# Patient Record
Sex: FEMALE | Race: WHITE | NOT HISPANIC OR LATINO | Employment: FULL TIME | URBAN - METROPOLITAN AREA
[De-identification: names, ages, dates, MRNs, and addresses within clinical notes are randomized per-mention and may not be internally consistent; named-entity substitution may affect disease eponyms.]

---

## 2023-11-08 ENCOUNTER — OFFICE VISIT (OUTPATIENT)
Dept: PRIMARY CARE | Facility: CLINIC | Age: 38
End: 2023-11-08
Payer: COMMERCIAL

## 2023-11-08 ENCOUNTER — LAB (OUTPATIENT)
Dept: LAB | Facility: LAB | Age: 38
End: 2023-11-08
Payer: COMMERCIAL

## 2023-11-08 VITALS
HEART RATE: 90 BPM | WEIGHT: 152 LBS | BODY MASS INDEX: 26.93 KG/M2 | OXYGEN SATURATION: 100 % | HEIGHT: 63 IN | SYSTOLIC BLOOD PRESSURE: 98 MMHG | DIASTOLIC BLOOD PRESSURE: 62 MMHG

## 2023-11-08 DIAGNOSIS — E55.9 VITAMIN D DEFICIENCY: ICD-10-CM

## 2023-11-08 DIAGNOSIS — Z00.00 WELLNESS EXAMINATION: Primary | ICD-10-CM

## 2023-11-08 DIAGNOSIS — E55.9 VITAMIN D DEFICIENCY: Primary | ICD-10-CM

## 2023-11-08 DIAGNOSIS — K21.9 GASTROESOPHAGEAL REFLUX DISEASE, UNSPECIFIED WHETHER ESOPHAGITIS PRESENT: ICD-10-CM

## 2023-11-08 DIAGNOSIS — R53.83 OTHER FATIGUE: ICD-10-CM

## 2023-11-08 DIAGNOSIS — F41.8 DEPRESSION WITH ANXIETY: ICD-10-CM

## 2023-11-08 DIAGNOSIS — R20.2 PARESTHESIA: ICD-10-CM

## 2023-11-08 PROBLEM — D22.9 NUMEROUS MOLES: Status: ACTIVE | Noted: 2018-03-17

## 2023-11-08 PROBLEM — M43.16 SPONDYLOLISTHESIS, LUMBAR REGION: Status: ACTIVE | Noted: 2020-01-30

## 2023-11-08 PROBLEM — M51.369 DEGENERATIVE LUMBAR DISC: Status: ACTIVE | Noted: 2023-11-08

## 2023-11-08 PROBLEM — R20.0 RIGHT LEG NUMBNESS: Status: ACTIVE | Noted: 2023-11-08

## 2023-11-08 PROBLEM — M51.36 DEGENERATIVE LUMBAR DISC: Status: ACTIVE | Noted: 2023-11-08

## 2023-11-08 LAB
TSH SERPL-ACNC: 1.55 MIU/L (ref 0.44–3.98)
VIT B12 SERPL-MCNC: 348 PG/ML (ref 211–911)

## 2023-11-08 PROCEDURE — 36415 COLL VENOUS BLD VENIPUNCTURE: CPT

## 2023-11-08 PROCEDURE — 84443 ASSAY THYROID STIM HORMONE: CPT

## 2023-11-08 PROCEDURE — 82607 VITAMIN B-12: CPT

## 2023-11-08 PROCEDURE — 82306 VITAMIN D 25 HYDROXY: CPT

## 2023-11-08 PROCEDURE — 99214 OFFICE O/P EST MOD 30 MIN: CPT | Performed by: INTERNAL MEDICINE

## 2023-11-08 PROCEDURE — 1036F TOBACCO NON-USER: CPT | Performed by: INTERNAL MEDICINE

## 2023-11-08 RX ORDER — PANTOPRAZOLE SODIUM 40 MG/1
40 TABLET, DELAYED RELEASE ORAL
COMMUNITY
Start: 2023-06-27 | End: 2024-04-23

## 2023-11-08 RX ORDER — ERGOCALCIFEROL 1.25 MG/1
50000 CAPSULE ORAL
Qty: 12 CAPSULE | Refills: 1 | Status: SHIPPED | OUTPATIENT
Start: 2023-11-08 | End: 2023-11-12 | Stop reason: SDUPTHER

## 2023-11-08 RX ORDER — FLUOXETINE 20 MG/1
1 TABLET ORAL DAILY
COMMUNITY
End: 2023-11-08 | Stop reason: ALTCHOICE

## 2023-11-08 RX ORDER — ESCITALOPRAM OXALATE 10 MG/1
10 TABLET ORAL DAILY
Qty: 30 TABLET | Refills: 5 | Status: SHIPPED | OUTPATIENT
Start: 2023-11-08 | End: 2024-02-26

## 2023-11-08 ASSESSMENT — ENCOUNTER SYMPTOMS
NUMBNESS: 1
ARTHRALGIAS: 0
DYSPHORIC MOOD: 0
SLEEP DISTURBANCE: 0
PALPITATIONS: 0
DIZZINESS: 0
FATIGUE: 1
CONSTIPATION: 0
BACK PAIN: 1
DIARRHEA: 0
NERVOUS/ANXIOUS: 1
HEADACHES: 0
SHORTNESS OF BREATH: 0
COUGH: 0

## 2023-11-08 NOTE — PROGRESS NOTES
"Subjective   Patient ID: Isabelle Tam is a 38 y.o. female who presents for Weight Gain, Fatigue, Leg Problem (Right leg/ankle burning sensation), and Arm Problem (Arms are tingling).    Fatigue  Longstanding which patient rates as significant in degree.  Present at previous visit dating back to at least 6/2022.  In 4/2023, lab work completed including normal CBC and TSH.  In 9/2023, lab work including CBC, CMP normal.  Persistent vitamin D deficiency, not currently taking supplementation (previously on 50,000 IU weekly)  Patient is on longstanding treatment for depression/anxiety and states doing fairly well, though some increased anxiety.  No recent change in therapy.  No insomnia.  No observed erratic breathing or snoring per patient report from .  Occasional napping on the weekends.    Weight gain (approximately 20 pounds since 6/2022, stable since 4/2023)  Patient has been unable to lose weight.  Has been on low carbohydrate diet in the past, currently on [place] diet (with intermittent fasting) for the last 2 months without any weight loss.    Lumbar degenerative spine disorder with history of previous L5-S1 fusion  Longstanding posterior thigh and medial ankle \"burning\" pain.  Previously intermittent and now constant over the last few weeks  She follows with pain management, Dr. Cruz Velasco, at [facility].  MRI of the lumbar spine in October 2022 essentially normal with only mild L4-5 and L5-S1 foraminal stenosis.  No current back pain.  No leg weakness.  Appointment scheduled next week with Dr. Velasco    Generalized intermittent paresthesias  Patient describes localized \"tingling sensation\" which is migratory, involving all 4 extremities, most prominently left upper extremity.  No associated fasciculations.  No weakness, numbness, gait disturbance, neck pain.  No recent trauma or fall.  Onset 3 weeks ago.    Anxiety/depression  Longstanding treatment since age 16, has been on fluoxetine at same 20 " "mg dose for a number of years.  Briefly tried sertraline without benefit.  She and her  note increased anxiety in recent weeks/months.  Previously has seen psychologist, not currently, most recently 2 years ago prior to change in occupation (current job is going well)    Health maintenance  Vaccinations are up-to-date      Fatigue  Associated symptoms include fatigue and numbness. Pertinent negatives include no arthralgias, chest pain, coughing or headaches.        Review of Systems   Constitutional:  Positive for fatigue.   Respiratory:  Negative for cough and shortness of breath.    Cardiovascular:  Negative for chest pain, palpitations and leg swelling.   Gastrointestinal:  Negative for constipation and diarrhea.   Musculoskeletal:  Positive for back pain. Negative for arthralgias.   Neurological:  Positive for numbness. Negative for dizziness and headaches.   Psychiatric/Behavioral:  Negative for dysphoric mood and sleep disturbance. The patient is nervous/anxious.        Objective   BP 98/62 (BP Location: Left arm, Patient Position: Sitting, BP Cuff Size: Adult)   Pulse 90   Ht 1.6 m (5' 3\")   Wt 68.9 kg (152 lb)   SpO2 100%   BMI 26.93 kg/m²     Physical Exam  Vitals reviewed.   HENT:      Head: Normocephalic.      Right Ear: Tympanic membrane normal.      Left Ear: Tympanic membrane normal.      Mouth/Throat:      Mouth: Mucous membranes are moist.   Eyes:      Conjunctiva/sclera: Conjunctivae normal.      Pupils: Pupils are equal, round, and reactive to light.   Cardiovascular:      Rate and Rhythm: Normal rate and regular rhythm.   Pulmonary:      Effort: Pulmonary effort is normal.      Breath sounds: Normal breath sounds.   Abdominal:      General: Bowel sounds are normal.      Tenderness: There is no abdominal tenderness.   Musculoskeletal:      Right lower leg: No edema.      Left lower leg: No edema.   Neurological:      Mental Status: She is alert.      Comments: Facial movement " symmetric  Motor exam: 5/5 in all 4 extremities  DTRs: 2+ in all 4 extremities  Sensory to vibration and light touch: Intact  Negative Romberg, stable on single foot standing   Psychiatric:         Mood and Affect: Mood normal.         Assessment/Plan     Fatigue  TSH and B12 lab work ordered  Maintain adequate sleep at least 7 to 8 hours per night.  Observe for any erratic sleep pattern (per )    Weight gain (approximately 20 pounds since 6/2022, stable since 4/2023)  Referral for nutrition consult  Maintain low carbohydrate/low sugar diet.  Intermittent fasting can be continued.  Continue regular exercise routine with goal of 120-150 minutes/week (primarily aerobic for more calorie burning)    Lumbar degenerative spine disorder with history of previous L5-S1 fusion  Persistent right lower extremity pain, possible lumbar radiculopathy  Upcoming appointment with Dr. Velasco, pain management at [facility], scheduled for next week  Continue techniques to protect back (proper lifting, avoiding heavy lifting, etc.)    Generalized intermittent paresthesias  B12 and vitamin D lab work ordered  Treat low vitamin D  Monitor expectantly    Anxiety/depression  Stop fluoxetine   Start Lexapro/escitalopram 10 mg daily    Health maintenance  Vaccinations are up-to-date    Follow-up  Wellness exam/physical in February 2024  (Fasting lab work will be ordered to complete prior to visit)    Tyler North MD

## 2023-11-08 NOTE — PATIENT INSTRUCTIONS
Fatigue  TSH and B12 lab work ordered  Maintain adequate sleep at least 7 to 8 hours per night.  Observe for any erratic sleep pattern (per )    Weight gain (approximately 20 pounds since 6/2022, stable since 4/2023)  Referral for nutrition consult  Maintain low carbohydrate/low sugar diet.  Intermittent fasting can be continued.  Continue regular exercise routine with goal of 120-150 minutes/week (primarily aerobic for more calorie burning)    Lumbar degenerative spine disorder with history of previous L5-S1 fusion  Persistent right lower extremity pain, possible lumbar radiculopathy  Upcoming appointment with Dr. Velasco, pain management at [facility], scheduled for next week  Continue techniques to protect back (proper lifting, avoiding heavy lifting, etc.)    Generalized intermittent paresthesias  B12 and vitamin D lab work ordered  Treat low vitamin D  Monitor expectantly    Anxiety/depression  Stop fluoxetine   Start Lexapro/escitalopram 10 mg daily    Health maintenance  Vaccinations are up-to-date    Follow-up  Wellness exam/physical in February 2024  (Fasting lab work will be ordered to complete prior to visit)

## 2023-11-11 ENCOUNTER — TELEPHONE (OUTPATIENT)
Dept: PRIMARY CARE | Facility: CLINIC | Age: 38
End: 2023-11-11
Payer: COMMERCIAL

## 2023-11-11 DIAGNOSIS — E55.9 VITAMIN D DEFICIENCY: Primary | ICD-10-CM

## 2023-11-11 LAB — 25(OH)D3 SERPL-MCNC: 22 NG/ML (ref 30–100)

## 2023-11-12 ENCOUNTER — TELEPHONE (OUTPATIENT)
Dept: PRIMARY CARE | Facility: CLINIC | Age: 38
End: 2023-11-12
Payer: COMMERCIAL

## 2023-11-12 DIAGNOSIS — E55.9 VITAMIN D DEFICIENCY: ICD-10-CM

## 2023-11-12 RX ORDER — ERGOCALCIFEROL 1.25 MG/1
50000 CAPSULE ORAL
Qty: 12 CAPSULE | Refills: 3 | Status: SHIPPED | OUTPATIENT
Start: 2023-11-12

## 2023-11-13 NOTE — TELEPHONE ENCOUNTER
Vitamin D level is low.  Patient will be resumed on vitamin D 50,000 IU weekly prescription sent to [facility].    Tyler North MD

## 2023-12-13 DIAGNOSIS — N94.6 DYSMENORRHEA: Primary | ICD-10-CM

## 2023-12-14 RX ORDER — LEVONORGESTREL AND ETHINYL ESTRADIOL AND ETHINYL ESTRADIOL 150-30(84)
1 KIT ORAL DAILY
Qty: 91 TABLET | Refills: 2 | Status: SHIPPED | OUTPATIENT
Start: 2023-12-14 | End: 2024-02-09 | Stop reason: ALTCHOICE

## 2023-12-28 ENCOUNTER — OFFICE VISIT (OUTPATIENT)
Dept: UROLOGY | Facility: CLINIC | Age: 38
End: 2023-12-28
Payer: COMMERCIAL

## 2023-12-28 DIAGNOSIS — R39.9 UTI SYMPTOMS: Primary | ICD-10-CM

## 2023-12-28 DIAGNOSIS — N92.4 EXCESSIVE BLEEDING IN PREMENOPAUSAL PERIOD: ICD-10-CM

## 2023-12-28 DIAGNOSIS — N89.8 VAGINAL DISCHARGE: ICD-10-CM

## 2023-12-28 LAB
POC APPEARANCE, URINE: CLEAR
POC BILIRUBIN, URINE: NEGATIVE
POC BLOOD, URINE: ABNORMAL
POC COLOR, URINE: YELLOW
POC GLUCOSE, URINE: NEGATIVE MG/DL
POC KETONES, URINE: NEGATIVE MG/DL
POC LEUKOCYTES, URINE: ABNORMAL
POC NITRITE,URINE: NEGATIVE
POC PH, URINE: 6.5 PH
POC PROTEIN, URINE: NEGATIVE MG/DL
POC SPECIFIC GRAVITY, URINE: <=1.005
POC UROBILINOGEN, URINE: 0.2 EU/DL

## 2023-12-28 PROCEDURE — 87086 URINE CULTURE/COLONY COUNT: CPT

## 2023-12-28 PROCEDURE — 81003 URINALYSIS AUTO W/O SCOPE: CPT | Performed by: OBSTETRICS & GYNECOLOGY

## 2023-12-28 PROCEDURE — 87205 SMEAR GRAM STAIN: CPT

## 2023-12-28 PROCEDURE — 1036F TOBACCO NON-USER: CPT | Performed by: OBSTETRICS & GYNECOLOGY

## 2023-12-28 PROCEDURE — 99214 OFFICE O/P EST MOD 30 MIN: CPT | Performed by: OBSTETRICS & GYNECOLOGY

## 2023-12-28 RX ORDER — NITROFURANTOIN 25; 75 MG/1; MG/1
100 CAPSULE ORAL 2 TIMES DAILY
Qty: 14 CAPSULE | Refills: 0 | Status: SHIPPED | OUTPATIENT
Start: 2023-12-28 | End: 2024-01-04

## 2023-12-28 RX ORDER — NORETHINDRONE ACETATE AND ETHINYL ESTRADIOL .02; 1 MG/1; MG/1
1 TABLET ORAL DAILY
Qty: 63 TABLET | Refills: 4 | Status: SHIPPED | OUTPATIENT
Start: 2023-12-28 | End: 2024-03-11

## 2023-12-28 NOTE — PROGRESS NOTES
FOLLOW-UP VISIT     PROBLEM LIST:  1. Perimenopausal symptoms       HISTORY OF PRESENT ILLNESS:   Isabelle Tam is a 38 y.o. female who was last seen 09/07/23 for bothersome perimenopausal symptoms. We discussed weight training and the [place] diet, as well as a sleep study. She is presenting today for her 3 month follow up.       Patient has complaints of dysuria and vaginal itchiness, she denies any gross hematuria.     She reports heavy menstrual bleeding.  Has been off of seasonique X 6 months and has had heavy bleeding in December and November. (One episode lasted 2.5 weeks.)        PAST MEDICAL HISTORY:  Past Medical History:   Diagnosis Date    Infectious mononucleosis, unspecified without complication     Mononucleosis    Melanocytic nevi, unspecified     Skin moles       PAST SURGICAL HISTORY:  Past Surgical History:   Procedure Laterality Date    ESOPHAGOGASTRODUODENOSCOPY  11/18/2014    Diagnostic Esophagogastroduodenoscopy    SPINAL FUSION  03/26/2014    Spinal Arthrodesis    TONSILLECTOMY  03/26/2014    Tonsillectomy With Adenoidectomy        ALLERGIES:   Allergies   Allergen Reactions    Morphine Unknown        MEDICATIONS:   [unfilled]     SOCIAL HISTORY:  Patient  reports that she has never smoked. She has never used smokeless tobacco. She reports current alcohol use of about 3.0 standard drinks of alcohol per week. She reports that she does not use drugs.   Social History     Socioeconomic History    Marital status:      Spouse name: Not on file    Number of children: Not on file    Years of education: Not on file    Highest education level: Not on file   Occupational History    Not on file   Tobacco Use    Smoking status: Never    Smokeless tobacco: Never   Substance and Sexual Activity    Alcohol use: Yes     Alcohol/week: 3.0 standard drinks of alcohol     Types: 3 Glasses of wine per week     Comment: Wine    Drug use: Never    Sexual activity: Not on file   Other Topics Concern    Not  on file   Social History Narrative    Not on file     Social Determinants of Health     Financial Resource Strain: Not on file   Food Insecurity: Not on file   Transportation Needs: Not on file   Physical Activity: Not on file   Stress: Not on file   Social Connections: Not on file   Intimate Partner Violence: Not on file   Housing Stability: Not on file       FAMILY HISTORY:  Family History   Problem Relation Name Age of Onset    Other (PMR) Mother      Other (pre-DM) Mother      Thyroid disease Father      Colon cancer Maternal Grandmother  93    Heart attack Maternal Grandfather          in 50s    Prostate cancer Paternal Grandfather         REVIEW OF SYSTEMS:  Constitutional: Negative for fever and chills. Denies anorexia, weight loss.  Eyes: Negative for visual disturbance.   Respiratory: Negative for shortness of breath.    Cardiovascular: Negative for chest pain.   Gastrointestinal: Negative for nausea and vomiting.   Genitourinary: See interval history above.  Skin: Negative for rash.   Neurological: Negative for dizziness and numbness.   Psychiatric/Behavioral: Negative for confusion and decreased concentration.     PHYSICAL EXAM:  There were no vitals taken for this visit.  Constitutional: Patient appears well-developed and well-nourished. No distress.    Head: Normocephalic and atraumatic.    Neck: Normal range of motion.    Cardiovascular: Normal rate.    Pulmonary/Chest: Effort normal. No respiratory distress.   Abdominal: normal  : normal  Musculoskeletal: Normal range of motion.    Neurological: Alert and oriented to person, place, and time.  Psychiatric: Normal mood and affect. Behavior is normal. Thought content normal.      PATHOLOGY REVIEW:    RADIOLOGY REVIEW:  [unfilled]    LABORATORY REVIEW:   [unfilled]       Urine dipstick shows positive for WBC's and positive for RBC's.       Assessment:      Isabelle Tam is a 38 y.o. female with AUB symptoms presenting for follow up     Plan:      Urine cultured ordered   US ordered   Will start Macrobid empirically  Resume OCP (Junel)  Sent swab for BV    Scribe Attestation  By signing my name below, I, Henry Gallagher   attest that this documentation has been prepared under the direction and in the presence of Miya Mcduffie MD MPH.

## 2023-12-29 LAB
BACTERIA UR CULT: NORMAL
BACTERIAL VAGINOSIS VAG-IMP: NORMAL
CLUE CELLS VAG LPF-#/AREA: NORMAL /[LPF]
NUGENT SCORE: 4
YEAST VAG WET PREP-#/AREA: NORMAL

## 2024-01-05 ENCOUNTER — ANCILLARY PROCEDURE (OUTPATIENT)
Dept: RADIOLOGY | Facility: CLINIC | Age: 39
End: 2024-01-05
Payer: COMMERCIAL

## 2024-01-05 DIAGNOSIS — N92.4 EXCESSIVE BLEEDING IN PREMENOPAUSAL PERIOD: ICD-10-CM

## 2024-01-05 PROCEDURE — 76830 TRANSVAGINAL US NON-OB: CPT

## 2024-01-05 PROCEDURE — 76856 US EXAM PELVIC COMPLETE: CPT | Performed by: STUDENT IN AN ORGANIZED HEALTH CARE EDUCATION/TRAINING PROGRAM

## 2024-01-05 PROCEDURE — 76830 TRANSVAGINAL US NON-OB: CPT | Performed by: STUDENT IN AN ORGANIZED HEALTH CARE EDUCATION/TRAINING PROGRAM

## 2024-02-06 ENCOUNTER — LAB (OUTPATIENT)
Dept: LAB | Facility: LAB | Age: 39
End: 2024-02-06
Payer: COMMERCIAL

## 2024-02-06 DIAGNOSIS — Z00.00 WELLNESS EXAMINATION: ICD-10-CM

## 2024-02-06 LAB
25(OH)D3 SERPL-MCNC: 57 NG/ML (ref 30–100)
ALBUMIN SERPL BCP-MCNC: 4.4 G/DL (ref 3.4–5)
ALP SERPL-CCNC: 39 U/L (ref 33–110)
ALT SERPL W P-5'-P-CCNC: 12 U/L (ref 7–45)
ANION GAP SERPL CALC-SCNC: 14 MMOL/L (ref 10–20)
APPEARANCE UR: CLEAR
AST SERPL W P-5'-P-CCNC: 14 U/L (ref 9–39)
BASOPHILS # BLD AUTO: 0.05 X10*3/UL (ref 0–0.1)
BASOPHILS NFR BLD AUTO: 0.7 %
BILIRUB SERPL-MCNC: 0.8 MG/DL (ref 0–1.2)
BILIRUB UR STRIP.AUTO-MCNC: NEGATIVE MG/DL
BUN SERPL-MCNC: 15 MG/DL (ref 6–23)
CALCIUM SERPL-MCNC: 9.6 MG/DL (ref 8.6–10.6)
CHLORIDE SERPL-SCNC: 103 MMOL/L (ref 98–107)
CHOLEST SERPL-MCNC: 226 MG/DL (ref 0–199)
CHOLESTEROL/HDL RATIO: 3.7
CO2 SERPL-SCNC: 26 MMOL/L (ref 21–32)
COLOR UR: NORMAL
CREAT SERPL-MCNC: 0.8 MG/DL (ref 0.5–1.05)
CRP SERPL HS-MCNC: 2.7 MG/L
EGFRCR SERPLBLD CKD-EPI 2021: >90 ML/MIN/1.73M*2
EOSINOPHIL # BLD AUTO: 0.1 X10*3/UL (ref 0–0.7)
EOSINOPHIL NFR BLD AUTO: 1.4 %
ERYTHROCYTE [DISTWIDTH] IN BLOOD BY AUTOMATED COUNT: 12.9 % (ref 11.5–14.5)
EST. AVERAGE GLUCOSE BLD GHB EST-MCNC: 100 MG/DL
GLUCOSE SERPL-MCNC: 96 MG/DL (ref 74–99)
GLUCOSE UR STRIP.AUTO-MCNC: NORMAL MG/DL
HBA1C MFR BLD: 5.1 %
HCT VFR BLD AUTO: 44 % (ref 36–46)
HDLC SERPL-MCNC: 61.3 MG/DL
HGB BLD-MCNC: 13.8 G/DL (ref 12–16)
IMM GRANULOCYTES # BLD AUTO: 0.02 X10*3/UL (ref 0–0.7)
IMM GRANULOCYTES NFR BLD AUTO: 0.3 % (ref 0–0.9)
KETONES UR STRIP.AUTO-MCNC: NEGATIVE MG/DL
LDLC SERPL CALC-MCNC: 138 MG/DL
LEUKOCYTE ESTERASE UR QL STRIP.AUTO: NEGATIVE
LYMPHOCYTES # BLD AUTO: 3.13 X10*3/UL (ref 1.2–4.8)
LYMPHOCYTES NFR BLD AUTO: 43.7 %
MCH RBC QN AUTO: 28.3 PG (ref 26–34)
MCHC RBC AUTO-ENTMCNC: 31.4 G/DL (ref 32–36)
MCV RBC AUTO: 90 FL (ref 80–100)
MONOCYTES # BLD AUTO: 0.48 X10*3/UL (ref 0.1–1)
MONOCYTES NFR BLD AUTO: 6.7 %
NEUTROPHILS # BLD AUTO: 3.38 X10*3/UL (ref 1.2–7.7)
NEUTROPHILS NFR BLD AUTO: 47.2 %
NITRITE UR QL STRIP.AUTO: NEGATIVE
NON HDL CHOLESTEROL: 165 MG/DL (ref 0–149)
NRBC BLD-RTO: 0 /100 WBCS (ref 0–0)
PH UR STRIP.AUTO: 5.5 [PH]
PLATELET # BLD AUTO: 273 X10*3/UL (ref 150–450)
POTASSIUM SERPL-SCNC: 4.4 MMOL/L (ref 3.5–5.3)
PROT SERPL-MCNC: 6.7 G/DL (ref 6.4–8.2)
PROT UR STRIP.AUTO-MCNC: NEGATIVE MG/DL
RBC # BLD AUTO: 4.87 X10*6/UL (ref 4–5.2)
RBC # UR STRIP.AUTO: NEGATIVE /UL
SODIUM SERPL-SCNC: 139 MMOL/L (ref 136–145)
SP GR UR STRIP.AUTO: 1.02
TRIGL SERPL-MCNC: 134 MG/DL (ref 0–149)
TSH SERPL-ACNC: 1.34 MIU/L (ref 0.44–3.98)
UROBILINOGEN UR STRIP.AUTO-MCNC: NORMAL MG/DL
VLDL: 27 MG/DL (ref 0–40)
WBC # BLD AUTO: 7.2 X10*3/UL (ref 4.4–11.3)

## 2024-02-06 PROCEDURE — 80061 LIPID PANEL: CPT

## 2024-02-06 PROCEDURE — 82306 VITAMIN D 25 HYDROXY: CPT

## 2024-02-06 PROCEDURE — 84443 ASSAY THYROID STIM HORMONE: CPT

## 2024-02-06 PROCEDURE — 80053 COMPREHEN METABOLIC PANEL: CPT

## 2024-02-06 PROCEDURE — 83036 HEMOGLOBIN GLYCOSYLATED A1C: CPT

## 2024-02-06 PROCEDURE — 86141 C-REACTIVE PROTEIN HS: CPT

## 2024-02-06 PROCEDURE — 36415 COLL VENOUS BLD VENIPUNCTURE: CPT

## 2024-02-06 PROCEDURE — 85025 COMPLETE CBC W/AUTO DIFF WBC: CPT

## 2024-02-06 PROCEDURE — 81003 URINALYSIS AUTO W/O SCOPE: CPT

## 2024-02-09 ENCOUNTER — OFFICE VISIT (OUTPATIENT)
Dept: PRIMARY CARE | Facility: CLINIC | Age: 39
End: 2024-02-09
Payer: COMMERCIAL

## 2024-02-09 VITALS
DIASTOLIC BLOOD PRESSURE: 62 MMHG | SYSTOLIC BLOOD PRESSURE: 98 MMHG | HEART RATE: 79 BPM | BODY MASS INDEX: 27.14 KG/M2 | WEIGHT: 159 LBS | HEIGHT: 64 IN | OXYGEN SATURATION: 98 %

## 2024-02-09 DIAGNOSIS — F41.8 DEPRESSION WITH ANXIETY: ICD-10-CM

## 2024-02-09 DIAGNOSIS — M51.36 DEGENERATIVE LUMBAR DISC: ICD-10-CM

## 2024-02-09 DIAGNOSIS — Z12.31 ENCOUNTER FOR SCREENING MAMMOGRAM FOR MALIGNANT NEOPLASM OF BREAST: ICD-10-CM

## 2024-02-09 DIAGNOSIS — Z00.00 WELLNESS EXAMINATION: Primary | ICD-10-CM

## 2024-02-09 DIAGNOSIS — K21.9 GASTROESOPHAGEAL REFLUX DISEASE, UNSPECIFIED WHETHER ESOPHAGITIS PRESENT: ICD-10-CM

## 2024-02-09 DIAGNOSIS — D22.9 NUMEROUS MOLES: ICD-10-CM

## 2024-02-09 DIAGNOSIS — E55.9 VITAMIN D DEFICIENCY: ICD-10-CM

## 2024-02-09 DIAGNOSIS — E78.2 MIXED HYPERLIPIDEMIA: ICD-10-CM

## 2024-02-09 PROBLEM — R20.0 RIGHT LEG NUMBNESS: Status: RESOLVED | Noted: 2023-11-08 | Resolved: 2024-02-09

## 2024-02-09 PROCEDURE — 1036F TOBACCO NON-USER: CPT | Performed by: INTERNAL MEDICINE

## 2024-02-09 PROCEDURE — UHSPHYS PR UH SELECT PHYSICAL: Performed by: INTERNAL MEDICINE

## 2024-02-09 RX ORDER — GABAPENTIN 100 MG/1
CAPSULE ORAL
COMMUNITY
Start: 2024-01-16

## 2024-02-09 ASSESSMENT — ENCOUNTER SYMPTOMS
RHINORRHEA: 0
WHEEZING: 0
COUGH: 0
PALPITATIONS: 0
SHORTNESS OF BREATH: 0
NUMBNESS: 0
DIZZINESS: 1
CONSTIPATION: 0
DYSURIA: 0
DYSPHORIC MOOD: 0
WEAKNESS: 0
SLEEP DISTURBANCE: 0
BACK PAIN: 1
FATIGUE: 1
MYALGIAS: 0
DIARRHEA: 0

## 2024-02-09 ASSESSMENT — PROMIS GLOBAL HEALTH SCALE
RATE_MENTAL_HEALTH: VERY GOOD
CARRYOUT_SOCIAL_ACTIVITIES: GOOD
RATE_AVERAGE_FATIGUE: SEVERE
RATE_QUALITY_OF_LIFE: GOOD
RATE_PHYSICAL_HEALTH: GOOD
EMOTIONAL_PROBLEMS: NEVER
RATE_AVERAGE_PAIN: 4
RATE_SOCIAL_SATISFACTION: EXCELLENT
CARRYOUT_PHYSICAL_ACTIVITIES: MOSTLY
RATE_GENERAL_HEALTH: GOOD

## 2024-02-09 NOTE — PATIENT INSTRUCTIONS
Wellness exam/physical  Continued regular exercise with goal of 120-150 minutes/week recommended  Continued well-balanced diet rich in fruits, vegetables, fiber, lean protein recommended  Continued routine follow-up with ophthalmology and dentistry recommended  Continued routine follow-up with GYN recommended, up-to-date  Screening mammogram with tomosynthesis ordered    Weight gain  Upcoming nutrition consult scheduled on 2/26  Continue proper diet and routine exercise    GERD/heartburn  Continue pantoprazole 40 mg daily  Consider lower dose medication or tapering off medication after achieving weight loss    Depression/anxiety  Continue escitalopram 10 mg daily  Continue regular exercise  Continue engaging in activities and hobbies that provide enjoyment and relaxation  Maintain adequate sleep schedule    Vitamin D deficiency  Continue vitamin D 50,000 IU weekly  Repeat vitamin D level in May    Intermittent lightheadedness (low normal blood pressure at baseline, negative orthostatics today)  Maintain adequate fluid intake, particularly after exercise  Monitor expectantly    Degenerative lumbar disc disorder/SI joint disorder  Monitor expectantly  Continue back stretching/strengthening exercises  Follow-up with pain management, Dr. Cruz Velasco ([facility]) as needed    Numerous moles  Continue annual full-body skin exams with dermatology, Dr. Horton    Follow-up  Wellness exam/physical in 1 year, on/after 2/9/2025

## 2024-02-09 NOTE — PROGRESS NOTES
"Subjective   Patient ID: Isabelle Tam is a 38 y.o. female who presents for Annual Exam.    Wellness exam/physical    Weight gain  Ongoing challenges regarding weight loss.  Regular exercise, proper diet  Upcoming nutrition consult scheduled on 2/26 (private practice)    GERD/heartburn  Onset of symptoms approximately 10 years ago.  Has been on PPI therapy intermittently.  Currently on PPI therapy since GI consult with Dr. Azevedo last year.  Currently, symptoms are well-controlled.  Historically, patient had nocturnal symptoms with heartburn and occasional regurgitation    Depression/anxiety  At last visit, fluoxetine switched to escitalopram.  Anxiety may be slightly better controlled.    Vitamin D deficiency  Vitamin D level has improved, now 57 on recently started vitamin D 50,000 IU weekly    Intermittent lightheadedness  Patient describes episodes of \"dizziness\".  Associated nausea/emesis.  No precipitating events.  Can occur either in seated or standing position.  No diaphoresis, palpitations, chest pain, actual syncope.  Patient will occasionally lie down and symptoms resolved within 10 to 20 minutes.  Onset perhaps 1 to 2 years ago.    Degenerative lumbar disc disorder/SI joint disorder  Currently stable.  Recent trigger point injections to thoracic paraspinal muscles and SI joints per Dr. Cruz Velasco ([facility] pain management) which have been helpful.  Previous paresthesias have improved.    Numerous moles  Annual full-body skin exams with dermatology, Dr. Hotron.  Up-to-date.    Health maintenance  Exercise: Walking 1 hour and weightlifting, 4 times a week  GYN: Up-to-date with Dr. Mcduffie, last exam 12/28/2023  Breast cancer screening: No prior mammography, ordered  Ophthalmology: Up-to-date  Dermatology: Up-to-date, see above  Dentistry: Up-to-date    Social    Occupation:  with [employer]           Review of Systems   Constitutional:  Positive for fatigue.   HENT:  " "Negative for postnasal drip and rhinorrhea.    Respiratory:  Negative for cough, shortness of breath and wheezing.    Cardiovascular:  Negative for chest pain and palpitations.   Gastrointestinal:  Negative for constipation and diarrhea.   Genitourinary:  Negative for dysuria and urgency.   Musculoskeletal:  Positive for back pain. Negative for myalgias.   Neurological:  Positive for dizziness. Negative for weakness and numbness.   Psychiatric/Behavioral:  Negative for dysphoric mood and sleep disturbance.        Objective   BP 98/62 (BP Location: Left arm, Patient Position: Sitting, BP Cuff Size: Adult)   Pulse 79   Ht 1.626 m (5' 4\")   Wt 72.1 kg (159 lb)   SpO2 98%   BMI 27.29 kg/m²     Physical Exam  Vitals reviewed.   Constitutional:       Appearance: Normal appearance.   HENT:      Head: Normocephalic.      Right Ear: Tympanic membrane normal.      Left Ear: Tympanic membrane normal.      Mouth/Throat:      Mouth: Mucous membranes are moist.   Eyes:      Conjunctiva/sclera: Conjunctivae normal.   Neck:      Vascular: No carotid bruit.   Cardiovascular:      Rate and Rhythm: Normal rate and regular rhythm.   Pulmonary:      Effort: Pulmonary effort is normal.      Breath sounds: Normal breath sounds.   Abdominal:      General: Bowel sounds are normal.      Tenderness: There is no abdominal tenderness.   Genitourinary:     Comments: Breast exam: Normal nipples, no masses or axillary lymphadenopathy bilaterally.  (Chaperone offered, patient declines)  Musculoskeletal:      Right lower leg: No edema.      Left lower leg: No edema.      Comments: Bilateral hips knees with full range of motion   Lymphadenopathy:      Cervical: No cervical adenopathy.   Skin:     General: Skin is warm.   Neurological:      General: No focal deficit present.      Mental Status: She is alert and oriented to person, place, and time.   Psychiatric:         Mood and Affect: Mood normal.         Assessment/Plan     Wellness " exam/physical  Continued regular exercise with goal of 120-150 minutes/week recommended  Continued well-balanced diet rich in fruits, vegetables, fiber, lean protein recommended  Continued routine follow-up with ophthalmology and dentistry recommended  Continued routine follow-up with GYN recommended, up-to-date  Screening mammogram with tomosynthesis ordered    Weight gain  Upcoming nutrition consult scheduled on 2/26  Continue proper diet and routine exercise    GERD/heartburn  Continue pantoprazole 40 mg daily  Consider lower dose medication or tapering off medication after achieving weight loss    Depression/anxiety  Continue escitalopram 10 mg daily  Continue regular exercise  Continue engaging in activities and hobbies that provide enjoyment and relaxation  Maintain adequate sleep schedule    Vitamin D deficiency  Continue vitamin D 50,000 IU weekly  Repeat vitamin D level in May    Intermittent lightheadedness (low normal blood pressure at baseline, negative orthostatics today)  Maintain adequate fluid intake, particularly after exercise  Monitor expectantly    Degenerative lumbar disc disorder/SI joint disorder  Monitor expectantly  Continue back stretching/strengthening exercises  Referral to [facility] for massage therapy session  Follow-up with pain management, Dr. Cruz Velasco ([facility]) as needed    Numerous moles  Continue annual full-body skin exams with dermatology, Dr. Horton    Follow-up  Wellness exam/physical in 1 year, on/after 2/9/2025    Tyler North MD

## 2024-02-20 ENCOUNTER — HOSPITAL ENCOUNTER (OUTPATIENT)
Dept: RADIOLOGY | Facility: CLINIC | Age: 39
Discharge: HOME | End: 2024-02-20
Payer: COMMERCIAL

## 2024-02-20 ENCOUNTER — PATIENT MESSAGE (OUTPATIENT)
Dept: PRIMARY CARE | Facility: CLINIC | Age: 39
End: 2024-02-20

## 2024-02-20 VITALS — HEIGHT: 64 IN | WEIGHT: 158.95 LBS | BODY MASS INDEX: 27.14 KG/M2

## 2024-02-20 DIAGNOSIS — R63.5 ABNORMAL WEIGHT GAIN: ICD-10-CM

## 2024-02-20 DIAGNOSIS — Z12.31 ENCOUNTER FOR SCREENING MAMMOGRAM FOR MALIGNANT NEOPLASM OF BREAST: ICD-10-CM

## 2024-02-20 DIAGNOSIS — R53.83 OTHER FATIGUE: ICD-10-CM

## 2024-02-20 DIAGNOSIS — N63.22 MASS OF UPPER INNER QUADRANT OF LEFT BREAST: ICD-10-CM

## 2024-02-20 DIAGNOSIS — N63.20 MASS OF LEFT BREAST, UNSPECIFIED QUADRANT: Primary | ICD-10-CM

## 2024-02-20 PROCEDURE — 77063 BREAST TOMOSYNTHESIS BI: CPT | Performed by: RADIOLOGY

## 2024-02-20 PROCEDURE — 77067 SCR MAMMO BI INCL CAD: CPT | Performed by: RADIOLOGY

## 2024-02-20 PROCEDURE — 77067 SCR MAMMO BI INCL CAD: CPT

## 2024-02-24 DIAGNOSIS — F41.8 DEPRESSION WITH ANXIETY: ICD-10-CM

## 2024-02-26 RX ORDER — ESCITALOPRAM OXALATE 10 MG/1
10 TABLET ORAL DAILY
Qty: 90 TABLET | Refills: 3 | Status: SHIPPED | OUTPATIENT
Start: 2024-02-26

## 2024-02-26 NOTE — TELEPHONE ENCOUNTER
From: Isabelle Tam  To: Tyler North MD  Sent: 2/20/2024 4:33 PM EST  Subject: Hormone Testing    Dr. North,    I would like to get some hormone testing done to measure my estrogen, testosterone, etc, since I am still having so much trouble with fatigue and other symptoms. Is that something you can order for me? Or is there an endocrinologist you can refer me to? Thanks.

## 2024-02-27 DIAGNOSIS — R63.5 WEIGHT GAIN: ICD-10-CM

## 2024-02-29 ENCOUNTER — HOSPITAL ENCOUNTER (OUTPATIENT)
Dept: RADIOLOGY | Facility: CLINIC | Age: 39
Discharge: HOME | End: 2024-02-29
Payer: COMMERCIAL

## 2024-02-29 DIAGNOSIS — N63.22 MASS OF UPPER INNER QUADRANT OF LEFT BREAST: ICD-10-CM

## 2024-02-29 PROCEDURE — 76982 USE 1ST TARGET LESION: CPT

## 2024-02-29 PROCEDURE — 76642 ULTRASOUND BREAST LIMITED: CPT | Mod: LT

## 2024-02-29 PROCEDURE — 76642 ULTRASOUND BREAST LIMITED: CPT | Mod: LEFT SIDE | Performed by: RADIOLOGY

## 2024-03-10 DIAGNOSIS — N92.4 EXCESSIVE BLEEDING IN PREMENOPAUSAL PERIOD: ICD-10-CM

## 2024-03-10 PROBLEM — N60.02 BREAST CYST, LEFT: Status: ACTIVE | Noted: 2024-03-10

## 2024-03-11 RX ORDER — NORETHINDRONE ACETATE AND ETHINYL ESTRADIOL 1; 20 MG/1; UG/1
1 TABLET ORAL DAILY
Qty: 63 TABLET | Refills: 4 | Status: SHIPPED | OUTPATIENT
Start: 2024-03-11

## 2024-03-12 ENCOUNTER — APPOINTMENT (OUTPATIENT)
Dept: INTEGRATIVE MEDICINE | Facility: CLINIC | Age: 39
End: 2024-03-12
Payer: COMMERCIAL

## 2024-04-10 ENCOUNTER — APPOINTMENT (OUTPATIENT)
Dept: INTEGRATIVE MEDICINE | Facility: CLINIC | Age: 39
End: 2024-04-10

## 2024-04-23 DIAGNOSIS — K21.00 GASTROESOPHAGEAL REFLUX DISEASE WITH ESOPHAGITIS WITHOUT HEMORRHAGE: Primary | ICD-10-CM

## 2024-04-23 RX ORDER — PANTOPRAZOLE SODIUM 40 MG/1
40 TABLET, DELAYED RELEASE ORAL
Qty: 90 TABLET | Refills: 3 | Status: SHIPPED | OUTPATIENT
Start: 2024-04-23

## 2024-09-03 ENCOUNTER — HOSPITAL ENCOUNTER (OUTPATIENT)
Dept: RADIOLOGY | Facility: CLINIC | Age: 39
Discharge: HOME | End: 2024-09-03
Payer: COMMERCIAL

## 2024-09-03 DIAGNOSIS — N60.02 BREAST CYST, LEFT: ICD-10-CM

## 2024-09-03 PROCEDURE — 76982 USE 1ST TARGET LESION: CPT | Mod: LT,RT

## 2024-09-03 PROCEDURE — 76642 ULTRASOUND BREAST LIMITED: CPT | Mod: LT

## 2024-09-03 PROCEDURE — 76642 ULTRASOUND BREAST LIMITED: CPT | Mod: LEFT SIDE | Performed by: RADIOLOGY

## 2024-09-15 ENCOUNTER — OFFICE VISIT (OUTPATIENT)
Dept: URGENT CARE | Age: 39
End: 2024-09-15
Payer: COMMERCIAL

## 2024-09-15 VITALS
BODY MASS INDEX: 23.04 KG/M2 | TEMPERATURE: 95.6 F | WEIGHT: 130 LBS | HEIGHT: 63 IN | HEART RATE: 108 BPM | DIASTOLIC BLOOD PRESSURE: 75 MMHG | SYSTOLIC BLOOD PRESSURE: 95 MMHG | RESPIRATION RATE: 16 BRPM | OXYGEN SATURATION: 95 %

## 2024-09-15 DIAGNOSIS — J02.9 SORE THROAT: ICD-10-CM

## 2024-09-15 DIAGNOSIS — R50.9 FEVER, UNSPECIFIED FEVER CAUSE: Primary | ICD-10-CM

## 2024-09-15 LAB
POC BINAX EXPIRATION: 0
POC BINAX NOW COVID SERIAL NUMBER: 0
POC RAPID INFLUENZA A: NEGATIVE
POC RAPID INFLUENZA B: NEGATIVE
POC RAPID STREP: NEGATIVE
POC SARS-COV-2 AG BINAX: ABNORMAL

## 2024-09-15 NOTE — PATIENT INSTRUCTIONS
You tested positive for covid. Wearing a mask if you have to be in house and out if you have symptoms and isolating herself to one room. After your 10 days you can eliminate wearing a mask around others you are no longer contagious.

## 2024-09-15 NOTE — PROGRESS NOTES
Subjective   Patient ID: Isabelle Tam is a 38 y.o. female. They present today with a chief complaint of Sore Throat, Cough, Fever, and Earache (C/o sore throat with fever x 3 days.  C/o bilat ear pain, chills, upper chest tightness with cough).    History of Present Illness  HPI  Patient is a 38-year-old female who presents with sore throat and headache.  She states she has also had fevers chills and bodyaches.  This been going on since Thursday.  She checked a COVID test Friday which was negative.  She states the pain is getting worse.  She has been using over-the-counter Sudafed Tylenol and Motrin for her symptoms.  Past Medical History  Allergies as of 09/15/2024 - Reviewed 09/15/2024   Allergen Reaction Noted    Morphine Unknown 11/08/2023       (Not in a hospital admission)       Past Medical History:   Diagnosis Date    Infectious mononucleosis, unspecified without complication     Mononucleosis    Melanocytic nevi, unspecified     Skin moles       Past Surgical History:   Procedure Laterality Date    ESOPHAGOGASTRODUODENOSCOPY  11/18/2014    Diagnostic Esophagogastroduodenoscopy    SPINAL FUSION  03/26/2014    Spinal Arthrodesis    TONSILLECTOMY  03/26/2014    Tonsillectomy With Adenoidectomy        reports that she has never smoked. She has never used smokeless tobacco. She reports current alcohol use of about 3.0 standard drinks of alcohol per week. She reports that she does not use drugs.    Review of Systems  Review of Systems  Gen: +fatigue, +fever, sweats.  Head: + headache, trauma.  Eyes: No vision loss, double vision, drainage, eye pain.  ENT: No hearing changes, + throat pain, epistaxis, congestion  Cardiac: No chest pain  Pulmonary: No shortness of breath,  pleuritic pain,   Heme/lymph: No swollen glands  GI: No abdominal pain, nausea, vomiting, diarrhea  : No  dysuria, frequency, urgency, hematuria  Musculoskeletal: No limb pain, joint pain, back pain, joint swelling or stiffness.  Skin: No  "rashes, pruritus, lumps, lesions.  Neuro: No Numbness, tingling, or weakness.  Psych: No  anxiety     Review of systems is otherwise negative unless stated above or in history of present illness.                             Objective    Vitals:    09/15/24 0813   BP: 95/75   BP Location: Left arm   Patient Position: Sitting   BP Cuff Size: Adult   Pulse: 108   Resp: 16   Temp: 35.3 °C (95.6 °F)   TempSrc: Oral   SpO2: 95%   Weight: 59 kg (130 lb)   Height: 1.6 m (5' 3\")     No LMP recorded.    Physical Exam  General: Vital signs stable, Pt is alert, no acute distress  Eyes: Conjunctiva normal, PERRL, EOMs intact  HENMT: Normocephalic, atraumatic, external ears and nose normal, no scars or masses.  No mastoid tenderness. Trachea is midline. No meningeal signs, negative Kernig and Brudzinski, moves neck freely.  No sinus tenderness  Resp: Respiratory effort is normal, no retractions, no stridor. Lungs CTA, no wheezes or rhonchi  CV: Heart is regular rate and rhythm.   Skin: No evidence of trauma, skin is warm and dry. No rashes, lesions or ulcers.  Skel: full range of motion of upper and lower extremities.   Neuro: Normal gait, CN II-XII intact, no motor or sensory changes.  Psych: Alert and oriented ×3, judgment is appropriate, normal mood and affect   Procedures    Point of Care Test & Imaging Results from this visit  Results for orders placed or performed in visit on 09/15/24   POCT rapid strep A manually resulted   Result Value Ref Range    POC Rapid Strep Negative Negative   POCT Covid-19 Rapid Antigen   Result Value Ref Range    Binax NOW Covid Serial Number 0     BINAX NOW Covid Expiration 0     POC SALINAS-COV-2 AG Positive test for SARS-CoV-2 (antigen detected) (A) Presumptive negative test for SARS-CoV-2 (no antigen detected)   POCT Influenza A/B manually resulted   Result Value Ref Range    POC Rapid Influenza A Negative Negative    POC Rapid Influenza B Negative Negative      No results found.    Diagnostic " study results (if any) were reviewed by CRISTEL Diego.    Assessment/Plan   Allergies, medications, history, and pertinent labs/EKGs/Imaging reviewed by CRISTEL Diego.     Medical Decision Making      Orders and Diagnoses  Diagnoses and all orders for this visit:  Fever, unspecified fever cause  -     POCT Covid-19 Rapid Antigen  -     POCT Influenza A/B manually resulted  Sore throat  -     POCT rapid strep A manually resulted  -     POCT Covid-19 Rapid Antigen      Medical Admin Record  Patient tested positive for COVID-19 today. Patient was symptomatic, but is no longer at this time. Patient has quarantined for 10 days from symptom onset. COVID testing was performed as requested. No evidence of strep, pneumonia, otitis, bacterial sinusitis, other bacterial etiology, or sepsis.  Encouraged patient to continue supportive care measures and symptom management as needed. Advised to follow-up with primary care provider if symptoms return. Patient agreeable with plan and verbalized understanding.      Follow Up Instructions  No follow-ups on file.    Patient disposition: Home    Electronically signed by CRISTEL Diego  8:31 AM

## 2024-10-24 ENCOUNTER — TELEPHONE (OUTPATIENT)
Dept: PRIMARY CARE | Facility: CLINIC | Age: 39
End: 2024-10-24
Payer: COMMERCIAL

## 2024-10-30 ENCOUNTER — LAB (OUTPATIENT)
Dept: LAB | Facility: LAB | Age: 39
End: 2024-10-30
Payer: COMMERCIAL

## 2024-10-30 ENCOUNTER — OFFICE VISIT (OUTPATIENT)
Dept: PRIMARY CARE | Facility: CLINIC | Age: 39
End: 2024-10-30
Payer: COMMERCIAL

## 2024-10-30 VITALS
HEART RATE: 89 BPM | WEIGHT: 126 LBS | DIASTOLIC BLOOD PRESSURE: 60 MMHG | OXYGEN SATURATION: 99 % | SYSTOLIC BLOOD PRESSURE: 100 MMHG | BODY MASS INDEX: 22.32 KG/M2

## 2024-10-30 DIAGNOSIS — M54.6 CHRONIC MIDLINE THORACIC BACK PAIN: ICD-10-CM

## 2024-10-30 DIAGNOSIS — E55.9 VITAMIN D DEFICIENCY: ICD-10-CM

## 2024-10-30 DIAGNOSIS — G89.29 CHRONIC MIDLINE THORACIC BACK PAIN: ICD-10-CM

## 2024-10-30 DIAGNOSIS — F41.8 DEPRESSION WITH ANXIETY: ICD-10-CM

## 2024-10-30 DIAGNOSIS — R94.31 LONG QT INTERVAL: ICD-10-CM

## 2024-10-30 DIAGNOSIS — R09.81 NASAL CONGESTION: Primary | ICD-10-CM

## 2024-10-30 LAB — 25(OH)D3 SERPL-MCNC: 23 NG/ML (ref 30–100)

## 2024-10-30 PROCEDURE — 36415 COLL VENOUS BLD VENIPUNCTURE: CPT

## 2024-10-30 PROCEDURE — 82306 VITAMIN D 25 HYDROXY: CPT

## 2024-10-30 PROCEDURE — 93000 ELECTROCARDIOGRAM COMPLETE: CPT | Performed by: INTERNAL MEDICINE

## 2024-10-30 PROCEDURE — 99214 OFFICE O/P EST MOD 30 MIN: CPT | Performed by: INTERNAL MEDICINE

## 2024-10-30 RX ORDER — CETIRIZINE HYDROCHLORIDE, PSEUDOEPHEDRINE HYDROCHLORIDE 5; 120 MG/1; MG/1
1 TABLET, FILM COATED, EXTENDED RELEASE ORAL 2 TIMES DAILY
Start: 2024-10-30 | End: 2025-10-30

## 2024-10-30 NOTE — PATIENT INSTRUCTIONS
Headache with sinus pressure/ear pressure  Trial of Zyrtec-D12 hour, 1 tablet twice a day    Prolonged QT interval (on previous ECG at ED), resolved on current EKG  Monitor expectantly    Thoracic back pain, chronic with increased symptoms  Await thoracic MRI and additional treatment for pain management at [facility], Dr. Cruz Velasco    Vitamin D deficiency  Check vitamin D level, currently off vitamin D supplementation    Depression/anxiety  Continue escitalopram 10 mg daily    Health maintenance   Vaccinations are up-to-date    Follow-up  Wellness exam/physical in 2/2025  (Fasting lab work we order complete 3 to 5 days prior)

## 2024-10-30 NOTE — PROGRESS NOTES
Subjective   Patient ID: Isabelle Tam is a 38 y.o. female who presents for Follow-up.    Emergency department follow-up    Patient with episode of COVID-19 virus with first symptoms on 9/16.  Patient with symptoms that included cough, head congestion, loss of taste and smell.  Symptoms resolved after 2 weeks.  On 10/11, patient received both influenza and COVID-19 vaccines.  On 10/23, patient developed acute dizziness with associated emesis.  She subsequently went to urgent care and was referred to the emergency department due to additional complaint of right lower quadrant pain.  In the emergency department, patient had CT of the head which was negative including normal sinuses.  CT of the abdomen pelvis was normal except for functional ovarian cyst.  Patient was sent home with diagnosis of sinusitis, prescribed 5-day course of Augmentin and meclizine.  Patient coincidentally was started on Cymbalta by pain management for ongoing neck pain, originally prescribed on 10/17.  Patient discontinued medication after returning from the ED and her symptoms of dizziness have resolved.    She continues to have some headache and sinus pressure/ear pressure.    ED noted ECG with prolonged QT interval.  Note that patient has been on SSRI, Lexapro 10 mg daily, for some time without change.  Patient was on Cymbalta as well at time of ECG, though this medication is not known to prolong QT.  ECG today with normal QT interval.    Thoracic back pain, chronic with increased symptoms  Patient has been under the care of Dr. Cruz Velasco who is intermittently given trigger point injections which previously provided relief.  Thoracic MRI ordered for further evaluation, results to be reviewed by pain management for next steps in therapy.    Vitamin D deficiency  Patient is not taking vitamin D supplementation at this time.  Vitamin D ordered.    Health maintenance   Vaccinations are up-to-date           Review of Systems    Constitutional:  Negative for fatigue and fever.   HENT:  Positive for ear pain and sinus pressure.    Eyes:  Negative for visual disturbance.   Respiratory:  Negative for cough and shortness of breath.    Cardiovascular:  Negative for chest pain and palpitations.   Musculoskeletal:  Positive for neck pain.   Neurological:  Positive for headaches. Negative for dizziness and light-headedness.       Objective   /60 (BP Location: Left arm, Patient Position: Sitting, BP Cuff Size: Adult)   Pulse 89   Wt 57.2 kg (126 lb)   LMP  (LMP Unknown)   SpO2 99%   BMI 22.32 kg/m²     Physical Exam  Vitals reviewed.   Constitutional:       Appearance: Normal appearance.   HENT:      Head: Normocephalic.      Comments: No sinus tenderness with percussion     Right Ear: Tympanic membrane normal.      Left Ear: Tympanic membrane normal.      Mouth/Throat:      Mouth: Mucous membranes are moist.   Eyes:      Conjunctiva/sclera: Conjunctivae normal.   Cardiovascular:      Rate and Rhythm: Normal rate and regular rhythm.      Heart sounds: No murmur heard.  Pulmonary:      Effort: Pulmonary effort is normal.      Breath sounds: Normal breath sounds. No rales.   Musculoskeletal:      Right lower leg: No edema.      Left lower leg: No edema.   Neurological:      Mental Status: She is alert.      Comments: Normal facial movement  Normal motor strength in all 4 extremities         Assessment/Plan     Headache with sinus pressure/ear pressure  Trial of Zyrtec-D12 hour, 1 tablet twice a day    Prolonged QT interval (on previous ECG at ED), resolved on current EKG  Monitor expectantly    Thoracic back pain, chronic with increased symptoms  Await thoracic MRI and additional treatment for pain management at [facility], Dr. Cruz Velasco    Vitamin D deficiency  Check vitamin D level, currently off vitamin D supplementation    Depression/anxiety  Continue escitalopram 10 mg daily    Health maintenance   Vaccinations are  up-to-date    Follow-up  Wellness exam/physical in 2/2025  (Fasting lab work we order complete 3 to 5 days prior)    Tyler North MD

## 2024-11-01 ENCOUNTER — TELEPHONE (OUTPATIENT)
Dept: PRIMARY CARE | Facility: CLINIC | Age: 39
End: 2024-11-01
Payer: COMMERCIAL

## 2024-11-01 DIAGNOSIS — E55.9 VITAMIN D DEFICIENCY: Primary | ICD-10-CM

## 2024-11-01 RX ORDER — CHOLECALCIFEROL (VITAMIN D3) 50 MCG
50 TABLET ORAL DAILY
Start: 2024-11-01 | End: 2025-11-01

## 2024-11-06 DIAGNOSIS — Z00.00 WELLNESS EXAMINATION: Primary | ICD-10-CM

## 2024-11-06 ASSESSMENT — ENCOUNTER SYMPTOMS
PALPITATIONS: 0
DIZZINESS: 0
SHORTNESS OF BREATH: 0
LIGHT-HEADEDNESS: 0
FATIGUE: 0
COUGH: 0
NECK PAIN: 1
SINUS PRESSURE: 1
HEADACHES: 1
FEVER: 0

## 2025-02-08 ENCOUNTER — LAB (OUTPATIENT)
Dept: LAB | Facility: HOSPITAL | Age: 40
End: 2025-02-08
Payer: COMMERCIAL

## 2025-02-08 DIAGNOSIS — Z00.00 WELLNESS EXAMINATION: ICD-10-CM

## 2025-02-08 LAB
25(OH)D3 SERPL-MCNC: 21 NG/ML (ref 30–100)
ALBUMIN SERPL BCP-MCNC: 4.9 G/DL (ref 3.4–5)
ALP SERPL-CCNC: 47 U/L (ref 33–110)
ALT SERPL W P-5'-P-CCNC: 16 U/L (ref 7–45)
ANION GAP SERPL CALC-SCNC: 11 MMOL/L (ref 10–20)
APPEARANCE UR: CLEAR
AST SERPL W P-5'-P-CCNC: 16 U/L (ref 9–39)
BASOPHILS # BLD AUTO: 0.05 X10*3/UL (ref 0–0.1)
BASOPHILS NFR BLD AUTO: 0.8 %
BILIRUB SERPL-MCNC: 0.9 MG/DL (ref 0–1.2)
BILIRUB UR STRIP.AUTO-MCNC: NEGATIVE MG/DL
BUN SERPL-MCNC: 19 MG/DL (ref 6–23)
CALCIUM SERPL-MCNC: 9.8 MG/DL (ref 8.6–10.6)
CHLORIDE SERPL-SCNC: 101 MMOL/L (ref 98–107)
CHOLEST SERPL-MCNC: 215 MG/DL (ref 0–199)
CHOLESTEROL/HDL RATIO: 3.2
CO2 SERPL-SCNC: 30 MMOL/L (ref 21–32)
COLOR UR: ABNORMAL
CREAT SERPL-MCNC: 0.69 MG/DL (ref 0.5–1.05)
CRP SERPL HS-MCNC: 0.7 MG/L
EGFRCR SERPLBLD CKD-EPI 2021: >90 ML/MIN/1.73M*2
EOSINOPHIL # BLD AUTO: 0.08 X10*3/UL (ref 0–0.7)
EOSINOPHIL NFR BLD AUTO: 1.3 %
ERYTHROCYTE [DISTWIDTH] IN BLOOD BY AUTOMATED COUNT: 12.3 % (ref 11.5–14.5)
EST. AVERAGE GLUCOSE BLD GHB EST-MCNC: 91 MG/DL
GLUCOSE SERPL-MCNC: 91 MG/DL (ref 74–99)
GLUCOSE UR STRIP.AUTO-MCNC: NORMAL MG/DL
HBA1C MFR BLD: 4.8 %
HCT VFR BLD AUTO: 43.3 % (ref 36–46)
HDLC SERPL-MCNC: 67.3 MG/DL
HGB BLD-MCNC: 13.9 G/DL (ref 12–16)
IMM GRANULOCYTES # BLD AUTO: 0.01 X10*3/UL (ref 0–0.7)
IMM GRANULOCYTES NFR BLD AUTO: 0.2 % (ref 0–0.9)
KETONES UR STRIP.AUTO-MCNC: NEGATIVE MG/DL
LDLC SERPL CALC-MCNC: 129 MG/DL
LEUKOCYTE ESTERASE UR QL STRIP.AUTO: NEGATIVE
LYMPHOCYTES # BLD AUTO: 3.09 X10*3/UL (ref 1.2–4.8)
LYMPHOCYTES NFR BLD AUTO: 50.2 %
MCH RBC QN AUTO: 28.8 PG (ref 26–34)
MCHC RBC AUTO-ENTMCNC: 32.1 G/DL (ref 32–36)
MCV RBC AUTO: 90 FL (ref 80–100)
MONOCYTES # BLD AUTO: 0.45 X10*3/UL (ref 0.1–1)
MONOCYTES NFR BLD AUTO: 7.3 %
NEUTROPHILS # BLD AUTO: 2.48 X10*3/UL (ref 1.2–7.7)
NEUTROPHILS NFR BLD AUTO: 40.2 %
NITRITE UR QL STRIP.AUTO: NEGATIVE
NON HDL CHOLESTEROL: 148 MG/DL (ref 0–149)
NRBC BLD-RTO: 0 /100 WBCS (ref 0–0)
PH UR STRIP.AUTO: 7 [PH]
PLATELET # BLD AUTO: 295 X10*3/UL (ref 150–450)
POTASSIUM SERPL-SCNC: 4.5 MMOL/L (ref 3.5–5.3)
PROT SERPL-MCNC: 7.2 G/DL (ref 6.4–8.2)
PROT UR STRIP.AUTO-MCNC: NEGATIVE MG/DL
RBC # BLD AUTO: 4.83 X10*6/UL (ref 4–5.2)
RBC # UR STRIP.AUTO: ABNORMAL MG/DL
RBC #/AREA URNS AUTO: NORMAL /HPF
SODIUM SERPL-SCNC: 137 MMOL/L (ref 136–145)
SP GR UR STRIP.AUTO: 1.02
SQUAMOUS #/AREA URNS AUTO: NORMAL /HPF
TRIGL SERPL-MCNC: 94 MG/DL (ref 0–149)
TSH SERPL-ACNC: 1.64 MIU/L (ref 0.44–3.98)
UROBILINOGEN UR STRIP.AUTO-MCNC: NORMAL MG/DL
VLDL: 19 MG/DL (ref 0–40)
WBC # BLD AUTO: 6.2 X10*3/UL (ref 4.4–11.3)
WBC #/AREA URNS AUTO: NORMAL /HPF

## 2025-02-14 ENCOUNTER — PATIENT MESSAGE (OUTPATIENT)
Dept: PRIMARY CARE | Facility: CLINIC | Age: 40
End: 2025-02-14

## 2025-02-14 ENCOUNTER — APPOINTMENT (OUTPATIENT)
Dept: PRIMARY CARE | Facility: CLINIC | Age: 40
End: 2025-02-14
Payer: COMMERCIAL

## 2025-02-14 ENCOUNTER — LAB (OUTPATIENT)
Dept: LAB | Facility: HOSPITAL | Age: 40
End: 2025-02-14
Payer: COMMERCIAL

## 2025-02-14 VITALS
SYSTOLIC BLOOD PRESSURE: 100 MMHG | BODY MASS INDEX: 23.05 KG/M2 | WEIGHT: 135 LBS | HEART RATE: 64 BPM | DIASTOLIC BLOOD PRESSURE: 62 MMHG | HEIGHT: 64 IN

## 2025-02-14 DIAGNOSIS — N60.02 BREAST CYST, LEFT: ICD-10-CM

## 2025-02-14 DIAGNOSIS — Z00.00 WELLNESS EXAMINATION: Primary | ICD-10-CM

## 2025-02-14 DIAGNOSIS — R30.0 DYSURIA: ICD-10-CM

## 2025-02-14 DIAGNOSIS — D22.9 NUMEROUS MOLES: ICD-10-CM

## 2025-02-14 DIAGNOSIS — F41.8 DEPRESSION WITH ANXIETY: ICD-10-CM

## 2025-02-14 DIAGNOSIS — E78.2 MIXED HYPERLIPIDEMIA: ICD-10-CM

## 2025-02-14 DIAGNOSIS — R39.9 UTI SYMPTOMS: ICD-10-CM

## 2025-02-14 DIAGNOSIS — E55.9 VITAMIN D DEFICIENCY: ICD-10-CM

## 2025-02-14 DIAGNOSIS — G89.29 CHRONIC NECK PAIN: ICD-10-CM

## 2025-02-14 DIAGNOSIS — J35.9: ICD-10-CM

## 2025-02-14 DIAGNOSIS — M54.2 CHRONIC NECK PAIN: ICD-10-CM

## 2025-02-14 PROBLEM — R53.83 OTHER FATIGUE: Status: RESOLVED | Noted: 2023-04-05 | Resolved: 2025-02-14

## 2025-02-14 PROBLEM — M50.30 DEGENERATIVE DISC DISEASE, CERVICAL: Status: ACTIVE | Noted: 2025-02-14

## 2025-02-14 LAB
APPEARANCE UR: CLEAR
BILIRUB UR STRIP.AUTO-MCNC: NEGATIVE MG/DL
COLOR UR: COLORLESS
GLUCOSE UR STRIP.AUTO-MCNC: NORMAL MG/DL
KETONES UR STRIP.AUTO-MCNC: NEGATIVE MG/DL
LEUKOCYTE ESTERASE UR QL STRIP.AUTO: ABNORMAL
NITRITE UR QL STRIP.AUTO: NEGATIVE
PH UR STRIP.AUTO: 6 [PH]
PROT UR STRIP.AUTO-MCNC: NEGATIVE MG/DL
RBC # UR STRIP.AUTO: NEGATIVE MG/DL
RBC #/AREA URNS AUTO: NORMAL /HPF
SP GR UR STRIP.AUTO: 1.01
UROBILINOGEN UR STRIP.AUTO-MCNC: NORMAL MG/DL
WBC #/AREA URNS AUTO: NORMAL /HPF

## 2025-02-14 PROCEDURE — 87086 URINE CULTURE/COLONY COUNT: CPT

## 2025-02-14 PROCEDURE — 81001 URINALYSIS AUTO W/SCOPE: CPT

## 2025-02-14 RX ORDER — ERGOCALCIFEROL 1.25 MG/1
50000 CAPSULE ORAL WEEKLY
Qty: 12 CAPSULE | Refills: 3 | Status: SHIPPED | OUTPATIENT
Start: 2025-02-14 | End: 2026-02-14

## 2025-02-14 RX ORDER — NITROFURANTOIN 25; 75 MG/1; MG/1
100 CAPSULE ORAL 2 TIMES DAILY
Qty: 10 CAPSULE | Refills: 0 | Status: SHIPPED | OUTPATIENT
Start: 2025-02-14 | End: 2025-02-19

## 2025-02-14 ASSESSMENT — ENCOUNTER SYMPTOMS
ABDOMINAL PAIN: 0
PALPITATIONS: 0
RHINORRHEA: 0
DIARRHEA: 0
CONSTIPATION: 0
SHORTNESS OF BREATH: 0
NECK PAIN: 1
DIZZINESS: 0
HEADACHES: 0
COUGH: 0
FATIGUE: 0
WHEEZING: 0
DYSURIA: 0

## 2025-02-14 NOTE — PATIENT INSTRUCTIONS
Wellness exam/physical  Continued regular exercise with goal of 120-150 minutes/week recommended  Continued well-balanced diet rich in fruits, vegetables, fiber, lean protein recommended  Routine gynecology exam with Dr. Miya Mcduffie to be scheduled  Continued routine follow-up with optometry and dentistry recommended    Hyperlipidemia  Lipid panel reviewed and improved.  Continue healthy lifestyle with proper diet and exercise    Breast cancer screening/left breast cyst  Schedule screening bilateral mammogram and diagnostic left breast ultrasound, currently due    Right lingular tonsillar asymmetry (seen on recent C-spine MRI at [facility])  Referral to ENT, Dr. Beronica Mckeon, for consultation    Numerous moles  Annual full-body skin exam with dermatology, Dr. Horton    Vitamin D deficiency  Start vitamin D 50,000 IU weekly  Repeat vitamin D level in May    Dysuria, evaluate for urinary tract infection  Urinalysis with reflex microscopic/culture ordered    Chronic neck pain  Referral to  [facility] for complementary medical massage  Upcoming epidural injection per Dr. Cruz Velasco, [facility] pain management, is scheduled    Follow-up  Wellness exam/physical in 1 year, on/after 2/14/2026  (Fasting lab work will be ordered to complete 3 to 7 days prior)

## 2025-02-14 NOTE — PROGRESS NOTES
Subjective   Patient ID: Isabelle Tam is a 39 y.o. female who presents for Annual Exam.    Wellness exam/physical    Hyperlipidemia  Lipid panel reviewed and improved.  Patient with significant weight loss over the last year with clean diet and continued exercise    Weight loss  Patient went on provider-directed diet in 2/2024 with weight loss from 159 pounds to 135 pounds today, 24 pound weight loss.  She feels very well.  She continues to maintain her weight.    Breast cancer screening/left breast cyst  Currently due for annual screening mammography and 6-month follow-up left breast ultrasound    Right lingular tonsillar asymmetry (seen on recent C-spine MRI at [facility])  Patient denies any sore throat, cough, hoarseness.  No bleeding.  Non-smoker.  Referral to ENT recommended    Numerous moles  Annual full-body skin exam with dermatology, Dr. Horton, due in March    Vitamin D deficiency  Vitamin D level is 21, not currently taking supplementation.    Dysuria and frequency  Onset of symptoms in the last 24 hours.  Patient with history of UTIs.  No fever, chills, nausea, back pain    Chronic neck pain  C5-6 cervical degenerative disc disorder  Patient has upcoming epidural injection per [facility] pain management, Dr. Velasco.    Health maintenance  Exercise: Treadmill walking and weightlifting 3 days a week.  Home stretching exercises for her back most days.  Optometry: Up-to-date  Dermatology: See above  Dentistry: Up-to-date  GYN: Due for exam with Dr. Mcduffie    Social   and family doing well.  Recent rescue poodle named Mo, 3 cats.         Review of Systems   Constitutional:  Negative for fatigue.   HENT:  Negative for rhinorrhea.    Eyes:  Negative for visual disturbance.   Respiratory:  Negative for cough, shortness of breath and wheezing.    Cardiovascular:  Negative for chest pain and palpitations.   Gastrointestinal:  Negative for abdominal pain, constipation and diarrhea.   Genitourinary:  Negative for  "dysuria.   Musculoskeletal:  Positive for neck pain.   Neurological:  Negative for dizziness and headaches.       Objective   /62 (BP Location: Right arm, Patient Position: Sitting)   Pulse 64   Ht 1.619 m (5' 3.75\")   Wt 61.2 kg (135 lb)   BMI 23.35 kg/m²     Physical Exam  Vitals reviewed.   Constitutional:       Appearance: Normal appearance.   HENT:      Head: Normocephalic.      Right Ear: Tympanic membrane normal.      Left Ear: Tympanic membrane normal.      Mouth/Throat:      Mouth: Mucous membranes are moist.      Pharynx: No oropharyngeal exudate or posterior oropharyngeal erythema.      Comments: Possible mild asymmetry of right lingular tonsillar area.  Eyes:      Conjunctiva/sclera: Conjunctivae normal.      Pupils: Pupils are equal, round, and reactive to light.   Neck:      Vascular: No carotid bruit.   Cardiovascular:      Rate and Rhythm: Normal rate and regular rhythm.      Heart sounds: No murmur heard.  Pulmonary:      Effort: Pulmonary effort is normal.      Breath sounds: Normal breath sounds. No wheezing or rales.   Abdominal:      General: Bowel sounds are normal.      Palpations: Abdomen is soft.      Tenderness: There is no abdominal tenderness.   Genitourinary:     Comments: Breast exam: Normal nipples, no masses or axillary lymphadenopathy bilaterally.    Musculoskeletal:         General: Normal range of motion.      Right lower leg: No edema.      Left lower leg: No edema.   Lymphadenopathy:      Cervical: No cervical adenopathy.   Skin:     General: Skin is warm.      Findings: No rash.   Neurological:      General: No focal deficit present.      Mental Status: She is alert and oriented to person, place, and time.   Psychiatric:         Mood and Affect: Mood normal.         Assessment/Plan     Wellness exam/physical  Continued regular exercise with goal of 120-150 minutes/week recommended  Continued well-balanced diet rich in fruits, vegetables, fiber, lean protein " recommended  Routine gynecology exam with Dr. Miya Mcduffie to be scheduled  Continued routine follow-up with optometry and dentistry recommended    Hyperlipidemia  Lipid panel reviewed and improved.  Continue healthy lifestyle with proper diet and exercise    Breast cancer screening/left breast cyst  Schedule screening bilateral mammogram and diagnostic left breast ultrasound, currently due    Right lingular tonsillar asymmetry (seen on recent C-spine MRI at [facility])  Referral to ENT, Dr. Beronica Mckeon, for consultation    Numerous moles  Annual full-body skin exam with dermatology, Dr. Horton    Vitamin D deficiency  Start vitamin D 50,000 IU weekly  Repeat vitamin D level in May    Dysuria, evaluate for urinary tract infection  Urinalysis with reflex microscopic/culture ordered    Chronic neck pain  Referral to [facility] for complementary medical massage  Upcoming epidural injection per Dr. Cruz Velasco, [facility] pain management, is scheduled    Follow-up  Wellness exam/physical in 1 year, on/after 2/14/2026  (Fasting lab work will be ordered to complete 3 to 7 days prior)    Tyler North MD

## 2025-02-15 LAB
BACTERIA UR CULT: NORMAL
HOLD SPECIMEN: NORMAL

## 2025-02-15 NOTE — PATIENT COMMUNICATION
Neryd pended for Isabelle.  Please sign.  Thanks   Eucrisa Counseling: Patient may experience a mild burning sensation during topical application. Eucrisa is not approved in children less than 3 months of age.

## 2025-02-25 DIAGNOSIS — F41.8 DEPRESSION WITH ANXIETY: ICD-10-CM

## 2025-02-25 RX ORDER — ESCITALOPRAM OXALATE 10 MG/1
10 TABLET ORAL DAILY
Qty: 90 TABLET | Refills: 3 | Status: SHIPPED | OUTPATIENT
Start: 2025-02-25

## 2025-02-27 ENCOUNTER — HOSPITAL ENCOUNTER (OUTPATIENT)
Dept: RADIOLOGY | Facility: CLINIC | Age: 40
Discharge: HOME | End: 2025-02-27
Payer: COMMERCIAL

## 2025-02-27 VITALS — HEIGHT: 64 IN | BODY MASS INDEX: 23.03 KG/M2 | WEIGHT: 134.92 LBS

## 2025-02-27 DIAGNOSIS — Z12.31 ENCOUNTER FOR SCREENING MAMMOGRAM FOR MALIGNANT NEOPLASM OF BREAST: ICD-10-CM

## 2025-02-27 PROCEDURE — 77067 SCR MAMMO BI INCL CAD: CPT | Performed by: RADIOLOGY

## 2025-02-27 PROCEDURE — 77063 BREAST TOMOSYNTHESIS BI: CPT | Performed by: RADIOLOGY

## 2025-02-27 PROCEDURE — 77067 SCR MAMMO BI INCL CAD: CPT

## 2025-03-06 ENCOUNTER — PATIENT MESSAGE (OUTPATIENT)
Dept: PRIMARY CARE | Facility: CLINIC | Age: 40
End: 2025-03-06
Payer: COMMERCIAL

## 2025-03-06 DIAGNOSIS — N64.89 BREAST ASYMMETRY: ICD-10-CM

## 2025-03-06 DIAGNOSIS — N60.02 BREAST CYST, LEFT: Primary | ICD-10-CM

## 2025-03-07 ENCOUNTER — HOSPITAL ENCOUNTER (OUTPATIENT)
Dept: RADIOLOGY | Facility: CLINIC | Age: 40
Discharge: HOME | End: 2025-03-07
Payer: COMMERCIAL

## 2025-03-07 DIAGNOSIS — N60.02 BREAST CYST, LEFT: ICD-10-CM

## 2025-03-07 DIAGNOSIS — N64.89 BREAST ASYMMETRY: ICD-10-CM

## 2025-03-07 PROCEDURE — 76642 ULTRASOUND BREAST LIMITED: CPT | Mod: LT

## 2025-03-07 PROCEDURE — 77061 BREAST TOMOSYNTHESIS UNI: CPT | Mod: LT

## 2025-03-07 PROCEDURE — 76982 USE 1ST TARGET LESION: CPT | Mod: LT

## 2025-03-08 ENCOUNTER — APPOINTMENT (OUTPATIENT)
Dept: OTOLARYNGOLOGY | Facility: CLINIC | Age: 40
End: 2025-03-08
Payer: COMMERCIAL

## 2025-03-08 VITALS — HEIGHT: 64 IN | WEIGHT: 134 LBS | BODY MASS INDEX: 22.88 KG/M2

## 2025-03-08 DIAGNOSIS — J35.9: ICD-10-CM

## 2025-03-08 DIAGNOSIS — J35.1 HYPERTROPHY OF LINGUAL TONSIL: Primary | ICD-10-CM

## 2025-03-08 NOTE — PROGRESS NOTES
Subjective   Patient ID: Isabelle Tam is a 39 y.o. female  HPI  Patient is referred for further evaluation following a recent MRI of the spine.  She was noted to have some fullness in the vallecula and lower tonsil region on the right side.  She does have a previous history of tonsillectomy.  She has no hemoptysis phthisis or dysphagia or otalgia and has not had any sore throats or throat infections recently.  Review of Systems    Objective   Physical Exam  The following elements of a brief ear nose and throat exam were performed: External ear canals and tympanic membranes, external nose and nasal passages, oral cavity, palpation of the neck, percussion of the face, palpation of the thyroid.    There is some residual tonsil tissue noted in the lower tonsil fossa on the right side.  There is no tenderness noted.  Indirect mirror laryngoscopy is not successful due to strong gag reflex.  Fiberoptic laryngoscopy was offered in light of the extension of the fullness towards the vallecula.  The nasal cavity and nasopharynx and hypopharynx were noted to be clear.  There was residual tonsil tissue in the base of tongue and vallecula bilaterally slightly more prominently on the right side.  There is no ulceration or tenderness or erythema noted.  CT scan of the head dated October 24, 2024 was reviewed.  The MRI of the cervical spine dated February 5, 2025 was also reviewed.    Laryngoscopy    Date/Time: 3/8/2025 9:52 AM    Performed by: Beronica Mckeon MD  Authorized by: Beronica Mckeon MD    Consent:     Consent obtained:  Verbal  Procedure details:     Meds administered: Lidocaine and Afrin.    Instrument: flexible fiberoptic nasal endoscope    Comments:      After discussing the risks, limitations, potential benefits, and alternatives, the patient agreed to the procedure, and consent was given.  After application of topical Afrin with Lidocaine 2% to both nostrils, a fiberoptic endoscope was passed through the patent  nostril  Findings/Impression:  As noted in exam paragraph  The patient tolerated the procedure well.      Assessment/Plan   Diagnoses and all orders for this visit:  Hypertrophy of lingual tonsil (Primary)  Disorder of lingual tonsil  -     Referral to ENT  -     Laryngoscopy     Residual tonsil tissue following remote tonsillectomy and asymptomatic hypertrophy of the lingual tonsils more prominently on the right side recently noted on the MRI of the spine.  I have advised the patient that this tissue appears to be benign and I recommended follow-up checkup in 3 months or sooner if she notices any change in the area of concern.  Surgical excision or ablation were also discussed and it is an option if the patient becomes symptomatic or if the tonsil tissue enlarges further.

## 2025-04-29 ENCOUNTER — TELEPHONE (OUTPATIENT)
Dept: PRIMARY CARE | Facility: CLINIC | Age: 40
End: 2025-04-29
Payer: COMMERCIAL

## 2025-04-29 ENCOUNTER — TELEMEDICINE (OUTPATIENT)
Dept: URGENT CARE | Age: 40
End: 2025-04-29
Payer: COMMERCIAL

## 2025-04-29 DIAGNOSIS — R10.32 LEFT LOWER QUADRANT ABDOMINAL PAIN: Primary | ICD-10-CM

## 2025-04-29 PROCEDURE — 99213 OFFICE O/P EST LOW 20 MIN: CPT

## 2025-04-29 NOTE — TELEPHONE ENCOUNTER
Isabelle complains of diarrhea, bloating x 1 week.  She also vomited this morning.  She had a VV with urgent care this morning and they advised ED.  She doesn't want to wait at the ED.  Can you order labs or see her today?  Please advise or call [phone].  Thank you

## 2025-04-29 NOTE — PROGRESS NOTES
Urgent Care Virtual Video Visit    Patient Location: [place], [place]  Provider Location: [facility]      HPI :  Patient present on virtual visit for ongoing diarrhea and new onset of bilious vomiting this morning. Diarrhea has been going on for a week. She also explains that she has had left lower quadrant pain and ongoing abdominal bloating for the last week. Abdominal pain is not relieved with defecation and she describes it as constant. Denies fever, chills, sweats. Denies chance of pregnancy. Denies urinary symptoms.     PHYSICAL EXAM :  Limited secondary to nature of virtual visit  CONSTITUTIONAL: non-toxic appearing, no evidence of diaphoresis   HEENT: EOMi bilaterally  NECK: normal ROM  CV: appears well perfused, non-cyanotic  RESPIRATORY: no apparent respiratory distress, no evidence of accessory muscle use, no nasal flaring, no tripoding  SKIN: no evidence of any rash or lesion on visible skin   PSYCH: pleasant      VITALS : Unable to complete vitals secondary to nature of virtual visit  Pulse Ox: unavailable  BP: blood pressure cuff unavailable   Pulse: unavailable   RR: unavailable   Temp: unavailable      ASSESMENT AND PLAN :    MDM- Patient with signs and symptoms consistent with abdominal pain that cannot be properly assessed ion virtual visit. Patient appears stable. Unable to obtain HCG or UA at this time as there are limitations with virtual visit. Considering patients symptoms have been prolonged for over a week, not associated with fever, and patient does not have relief of abdominal pain with defecation, I believe that higher level of care and imaging is required for patients symptoms . Patient referred to ED for further evaluation and testing. Patient will be transferred via Private Vehicle to [facility].       I have communicated my name and active licensure. The patient's identity and physical location were verified at the time of this visit. Either the patient or their legal  representative has been informed of the risks and benefit of treatment through a remote evaluation vs in person visit and consented to proceed with the evaluation remotely. This visit was conducted using video and audio.      I have communicated my name and active licensure. Video visit completed with realtime synchronous video/audio connection. Informed consent was obtained from the patient. Patient was made aware that my evaluation and diagnosis are limited due to the fact that we are not in the same room during the interview and that this is a virtual encounter that took place via videoconferencing. Patient verbalized understanding.     Patient disposition: ED    Electronically signed by Philomena Figueroa PA-C  9:03 AM

## 2025-04-29 NOTE — TELEPHONE ENCOUNTER
I left a voicemail for patient to contact office to determine best course of action.  I have advised her to contact us/call back.    Tyler North MD

## 2025-04-30 ENCOUNTER — OFFICE VISIT (OUTPATIENT)
Facility: CLINIC | Age: 40
End: 2025-04-30
Payer: COMMERCIAL

## 2025-04-30 VITALS — HEART RATE: 65 BPM | HEIGHT: 64 IN | BODY MASS INDEX: 22.71 KG/M2 | WEIGHT: 133 LBS

## 2025-04-30 DIAGNOSIS — K52.9 GASTROENTERITIS: Primary | ICD-10-CM

## 2025-04-30 PROCEDURE — 99203 OFFICE O/P NEW LOW 30 MIN: CPT | Performed by: INTERNAL MEDICINE

## 2025-04-30 PROCEDURE — 3008F BODY MASS INDEX DOCD: CPT | Performed by: INTERNAL MEDICINE

## 2025-04-30 RX ORDER — DICYCLOMINE HYDROCHLORIDE 20 MG/1
20 TABLET ORAL EVERY 8 HOURS PRN
COMMUNITY
Start: 2025-04-29

## 2025-04-30 RX ORDER — ONDANSETRON 4 MG/1
4 TABLET, ORALLY DISINTEGRATING ORAL EVERY 6 HOURS PRN
COMMUNITY
Start: 2025-04-29 | End: 2025-05-06

## 2025-04-30 RX ORDER — FAMOTIDINE 20 MG/1
20 TABLET, FILM COATED ORAL 2 TIMES DAILY
COMMUNITY
Start: 2025-04-29 | End: 2025-05-13

## 2025-04-30 ASSESSMENT — ENCOUNTER SYMPTOMS
DIARRHEA: 1
NAUSEA: 1

## 2025-04-30 NOTE — PROGRESS NOTES
Diarrhea    History of Present Illness:   Isabelle Tam is a 39 y.o. female with PMHx of depression, anxiety, arthritis, hyperlipidemia and who presents to GI clinic for consultation.  She has had diarrhea for 10 days and yesterday had vomiting that prompted emergency room visit whereby blood work and stool studies were done.  These were all normal and negative for bacterial pathogens.  She has had loose bowel movements but today has not had a bowel movement all day.  Her stomach feels unsettled.  She is having increasing reflux.  She denies rectal bleeding, weight loss, fever, dysphagia or severe abdominal pain or distention.    Her medications are reviewed    I personally reviewed her labs and stool studies on her phone that were done at the [facility] yesterday.    She does not smoke or drink alcohol heavily, is a         Review of Systems  Review of Systems   Gastrointestinal:  Positive for diarrhea and nausea.       Allergies  RX Allergies[1]    Medications  Current Outpatient Medications   Medication Instructions    cholecalciferol (VITAMIN D3) 50 mcg, oral, Daily, (Take with food)    dicyclomine (BENTYL) 20 mg, Every 8 hours PRN    ergocalciferol (VITAMIN D-2) 50,000 Units, oral, Weekly    escitalopram (LEXAPRO) 10 mg, oral, Daily    famotidine (PEPCID) 20 mg, 2 times daily    gabapentin (Neurontin) 100 mg capsule TAKE 1 CAPSULE BY MOUTH DAILY AT BEDTIME  DAYS.    GENERIC EXTERNAL MEDICATION Minoxidil 1.5 mg/biotin 2.5 mg/Vitamin C 10 mg/B5 10 mg/B6 2 mg/Zinc 18 mg.  Take 1 capsule daily for hair loss    ondansetron ODT (ZOFRAN-ODT) 4 mg, Every 6 hours PRN        Objective   Visit Vitals  Pulse 65      Physical Exam  Constitutional:       Appearance: Normal appearance.   Eyes:      Conjunctiva/sclera: Conjunctivae normal.   Cardiovascular:      Rate and Rhythm: Normal rate and regular rhythm.   Pulmonary:      Effort: Pulmonary effort is normal.      Breath sounds: Normal breath sounds.    Abdominal:      General: Abdomen is flat. Bowel sounds are normal.      Palpations: Abdomen is soft.   Musculoskeletal:         General: Normal range of motion.      Cervical back: Normal range of motion.   Neurological:      Mental Status: She is alert.           Lab Results   Component Value Date    WBC 6.2 02/08/2025    WBC 7.2 02/06/2024    WBC 7.5 04/14/2021    HGB 13.9 02/08/2025    HGB 13.8 02/06/2024    HGB 14.4 04/14/2021    HCT 43.3 02/08/2025    HCT 44.0 02/06/2024    HCT 46.2 (H) 04/14/2021     02/08/2025     02/06/2024     04/14/2021     Lab Results   Component Value Date     02/08/2025     02/06/2024     04/14/2021    K 4.5 02/08/2025    K 4.4 02/06/2024    K 3.8 04/14/2021     02/08/2025     02/06/2024    CL 99 04/14/2021    CO2 30 02/08/2025    CO2 26 02/06/2024    CO2 30 04/14/2021    BUN 19 02/08/2025    BUN 15 02/06/2024    BUN 11 04/14/2021    CREATININE 0.69 02/08/2025    CREATININE 0.80 02/06/2024    CREATININE 0.74 04/14/2021    CALCIUM 9.8 02/08/2025    CALCIUM 9.6 02/06/2024    CALCIUM 10.1 04/14/2021    PROT 7.2 02/08/2025    PROT 6.7 02/06/2024    PROT 7.5 04/14/2021    BILITOT 0.9 02/08/2025    BILITOT 0.8 02/06/2024    BILITOT 1.3 (H) 04/14/2021    ALKPHOS 47 02/08/2025    ALKPHOS 39 02/06/2024    ALKPHOS 62 04/14/2021    ALT 16 02/08/2025    ALT 12 02/06/2024    ALT 37 04/14/2021    AST 16 02/08/2025    AST 14 02/06/2024    AST 32 04/14/2021    GLUCOSE 91 02/08/2025    GLUCOSE 96 02/06/2024    GLUCOSE 85 04/14/2021           Isabelle Tam is a 39 y.o. female who presents to GI clinic for probable resolving gastroenteritis with some aspect of mild gastroparesis in the setting of a viral illness.  I recommend a conservative approach and if symptoms persist consider colonoscopy and/or imaging.    Gastroenteritis  Patient is a 39-year-old with a history of depression, gastroesophageal reflux, anxiety, hyperlipidemia, and a 10-day history  of diarrhea that had not been getting better.  Yesterday she had some vomiting and went to the emergency room where she was evaluated by blood work which was all normal including CBC, comprehensive panel, lipase, and stool studies which were all normal.  She denies any other alarming symptoms of bleeding, black stool or weight loss.  She denies any dysphagia.  It is my impression she is suffering from some resolving gastroenteritis and there is no need for further evaluation at this point.  I recommend Metamucil daily, dicyclomine as needed and an antiacid orally as needed.  If things persist we could consider colonoscopy for diarrhea to rule out colitis or if other symptoms develop such as abdominal distention a CAT scan of the abdomen.  At this point we have the luxury of observing.       Finesse Azevedo MD              [1]   Allergies  Allergen Reactions    Morphine Unknown

## 2025-04-30 NOTE — ASSESSMENT & PLAN NOTE
Patient is a 39-year-old with a history of depression, gastroesophageal reflux, anxiety, hyperlipidemia, and a 10-day history of diarrhea that had not been getting better.  Yesterday she had some vomiting and went to the emergency room where she was evaluated by blood work which was all normal including CBC, comprehensive panel, lipase, and stool studies which were all normal.  She denies any other alarming symptoms of bleeding, black stool or weight loss.  She denies any dysphagia.  It is my impression she is suffering from some resolving gastroenteritis and there is no need for further evaluation at this point.  I recommend Metamucil daily, dicyclomine as needed and an antiacid orally as needed.  If things persist we could consider colonoscopy for diarrhea to rule out colitis or if other symptoms develop such as abdominal distention a CAT scan of the abdomen.  At this point we have the luxury of observing.

## 2025-05-01 DIAGNOSIS — E55.9 VITAMIN D DEFICIENCY: ICD-10-CM

## 2025-05-24 DIAGNOSIS — E55.9 VITAMIN D DEFICIENCY: ICD-10-CM

## 2025-05-24 RX ORDER — ERGOCALCIFEROL 1.25 MG/1
50000 CAPSULE ORAL
Qty: 12 CAPSULE | Refills: 3 | Status: SHIPPED | OUTPATIENT
Start: 2025-05-25

## 2025-06-07 ENCOUNTER — APPOINTMENT (OUTPATIENT)
Dept: OTOLARYNGOLOGY | Facility: CLINIC | Age: 40
End: 2025-06-07
Payer: COMMERCIAL

## 2025-06-07 VITALS — WEIGHT: 133 LBS | HEIGHT: 64 IN | BODY MASS INDEX: 22.71 KG/M2

## 2025-06-07 DIAGNOSIS — J35.9: ICD-10-CM

## 2025-06-07 DIAGNOSIS — J35.1 HYPERTROPHY OF LINGUAL TONSIL: Primary | ICD-10-CM

## 2025-06-07 PROCEDURE — 3008F BODY MASS INDEX DOCD: CPT | Performed by: OTOLARYNGOLOGY

## 2025-06-07 PROCEDURE — 1036F TOBACCO NON-USER: CPT | Performed by: OTOLARYNGOLOGY

## 2025-06-07 PROCEDURE — 99212 OFFICE O/P EST SF 10 MIN: CPT | Performed by: OTOLARYNGOLOGY

## 2025-06-07 NOTE — PROGRESS NOTES
Subjective   Patient ID: Isabelle Tam is a 39 y.o. female  HPI  Patient presents for follow-up for the mild hypertrophy of the lingual tonsils and residual tonsillar tissue in the inferior tonsil fossa on the right side following tonsillectomy many years ago.  She has no sore throat and no dysphagia and she has not noticed any change in her throat appearance.  Review of Systems    Objective   Physical Exam  There is minimal residual tonsil tissue in the inferior tonsil fossa on the right side.  There is also minimal hypertrophy of the lingual tonsils noted in the vallecula on indirect mirror laryngoscopy.  This appears to be unchanged or actually smaller than previously noted.  Assessment/Plan   Diagnoses and all orders for this visit:  Hypertrophy of lingual tonsil (Primary)  Disorder of lingual tonsil     Small amount of residual tonsil tissue following tonsillectomy and the inferior tonsil fossa on the right side and slight hypertrophy of the lingual tonsils that appears to have improved over the last 3 months.  The patient has no symptoms.  She will follow-up for repeat exam in 6 months.

## 2025-07-17 ENCOUNTER — APPOINTMENT (OUTPATIENT)
Dept: UROLOGY | Facility: CLINIC | Age: 40
End: 2025-07-17
Payer: COMMERCIAL

## 2025-07-31 ENCOUNTER — OFFICE VISIT (OUTPATIENT)
Dept: URGENT CARE | Age: 40
End: 2025-07-31
Payer: COMMERCIAL

## 2025-07-31 VITALS
HEIGHT: 64 IN | TEMPERATURE: 98.6 F | RESPIRATION RATE: 18 BRPM | SYSTOLIC BLOOD PRESSURE: 118 MMHG | BODY MASS INDEX: 22.71 KG/M2 | OXYGEN SATURATION: 99 % | WEIGHT: 133 LBS | DIASTOLIC BLOOD PRESSURE: 79 MMHG | HEART RATE: 71 BPM

## 2025-07-31 DIAGNOSIS — R30.0 DYSURIA: Primary | ICD-10-CM

## 2025-07-31 DIAGNOSIS — R30.0 BURNING WITH URINATION: ICD-10-CM

## 2025-07-31 LAB
POC APPEARANCE, URINE: CLEAR
POC BILIRUBIN, URINE: NEGATIVE
POC BLOOD, URINE: ABNORMAL
POC COLOR, URINE: ABNORMAL
POC GLUCOSE, URINE: NEGATIVE MG/DL
POC KETONES, URINE: NEGATIVE MG/DL
POC LEUKOCYTES, URINE: NEGATIVE
POC NITRITE,URINE: NEGATIVE
POC PH, URINE: 7 PH
POC PROTEIN, URINE: NEGATIVE MG/DL
POC SPECIFIC GRAVITY, URINE: <=1.005
POC UROBILINOGEN, URINE: 0.2 EU/DL
PREGNANCY TEST URINE, POC: NEGATIVE

## 2025-07-31 PROCEDURE — 1036F TOBACCO NON-USER: CPT

## 2025-07-31 PROCEDURE — 81003 URINALYSIS AUTO W/O SCOPE: CPT

## 2025-07-31 PROCEDURE — 99070 SPECIAL SUPPLIES PHYS/QHP: CPT

## 2025-07-31 PROCEDURE — 99214 OFFICE O/P EST MOD 30 MIN: CPT

## 2025-07-31 PROCEDURE — 3008F BODY MASS INDEX DOCD: CPT

## 2025-07-31 PROCEDURE — 81025 URINE PREGNANCY TEST: CPT

## 2025-07-31 RX ORDER — NITROFURANTOIN 25; 75 MG/1; MG/1
100 CAPSULE ORAL 2 TIMES DAILY
Qty: 10 CAPSULE | Refills: 0 | Status: SHIPPED | OUTPATIENT
Start: 2025-07-31 | End: 2025-08-05

## 2025-07-31 ASSESSMENT — ENCOUNTER SYMPTOMS
DYSURIA: 1
FREQUENCY: 1

## 2025-07-31 NOTE — PROGRESS NOTES
Subjective   Patient ID: Isabelle Tam is a 39 y.o. female. They present today with a chief complaint of No chief complaint on file..    History of Present Illness  Patient is a 39-year-old female with history of mixed hyperlipidemia, GERD and depression with anxiety who presents urgent care today with a complaint of urinary symptoms.  She states her symptoms started 2 days ago.  Specifically she endorses urinary urgency/frequency and burning.  She states this is similar to how she has felt with previous UTIs.  She denies any fevers, chills, nausea, vomiting, back pain or abdominal pain.  No other complaints or concerns mention at this time.      History provided by:  Patient      Past Medical History  Allergies as of 07/31/2025 - Reviewed 06/07/2025   Allergen Reaction Noted    Morphine Unknown 11/08/2023       Prescriptions Prior to Admission[1]       Medical History[2]    Surgical History[3]     reports that she has never smoked. She has never used smokeless tobacco. She reports current alcohol use of about 3.0 standard drinks of alcohol per week. She reports that she does not use drugs.    Review of Systems  Review of Systems   Genitourinary:  Positive for dysuria, frequency and urgency.                                  Objective    There were no vitals filed for this visit.  No LMP recorded.    Physical Exam  Vitals and nursing note reviewed.   Constitutional:       General: She is not in acute distress.     Appearance: Normal appearance. She is not ill-appearing, toxic-appearing or diaphoretic.   HENT:      Head: Normocephalic and atraumatic.      Mouth/Throat:      Mouth: Mucous membranes are moist.     Eyes:      Extraocular Movements: Extraocular movements intact.      Conjunctiva/sclera: Conjunctivae normal.      Pupils: Pupils are equal, round, and reactive to light.       Cardiovascular:      Rate and Rhythm: Normal rate and regular rhythm.      Pulses: Normal pulses.      Heart sounds: Normal heart  sounds.   Pulmonary:      Effort: Pulmonary effort is normal. No respiratory distress.      Breath sounds: Normal breath sounds. No stridor. No wheezing, rhonchi or rales.   Chest:      Chest wall: No tenderness.   Abdominal:      General: Abdomen is flat.      Palpations: Abdomen is soft.      Tenderness: There is no right CVA tenderness or left CVA tenderness.     Musculoskeletal:         General: Normal range of motion.      Cervical back: Normal range of motion and neck supple.     Skin:     General: Skin is warm and dry.      Capillary Refill: Capillary refill takes less than 2 seconds.     Neurological:      General: No focal deficit present.      Mental Status: She is alert and oriented to person, place, and time.     Psychiatric:         Mood and Affect: Mood normal.         Behavior: Behavior normal.         Procedures      Assessment/Plan   Allergies, medications, history, and pertinent labs/EKGs/Imaging reviewed by CRISTEL Palumbo.     Medical Decision Making  Patient is well appearing, afebrile, non toxic, not hypoxic, and appropriate for outpatient treatment and management at time of evaluation. Patient presents with urinary symptoms as described above. Differential includes but not limited to: Urinary tract infection, pyelonephritis, nephrolithiasis, other.    Urine pregnancy is negative.  Urinalysis negative for signs of infection or hematuria.  Through shared decision-making, it was decided to treat with antibiotics based on history and symptoms pending urine culture results.  She was provided with a prescription for Macrobid to use as directed.  Patient understands she will be notified if changes are required based on the results.  I discussed differential with the patient. Patient voices understanding and is agreeable to close follow-up with their PCP in the next 2-3 days. They understand they should go to the emergency room immediately for any new, worsening or concerning symptoms.  Patient understands return precautions and discharge instructions.        Orders and Diagnoses  There are no diagnoses linked to this encounter.    Medical Admin Record      Follow Up Instructions  No follow-ups on file.    Patient disposition: Home    Electronically signed by CRISTEL Palumbo  8:12 AM         [1] (Not in a hospital admission)  [2]   Past Medical History:  Diagnosis Date    Breast cyst 6 months ago    Infectious mononucleosis, unspecified without complication     Mononucleosis    Melanocytic nevi, unspecified     Skin moles   [3]   Past Surgical History:  Procedure Laterality Date    ESOPHAGOGASTRODUODENOSCOPY  11/18/2014    Diagnostic Esophagogastroduodenoscopy    SPINAL FUSION  03/26/2014    Spinal Arthrodesis    TONSILLECTOMY  03/26/2014    Tonsillectomy With Adenoidectomy

## 2025-07-31 NOTE — PATIENT INSTRUCTIONS
You were seen at Urgent Care today and diagnosed with dysuria.  Please treat as discussed. Please take medications as prescribed. Monitor for red flags which we spoke about, If your symptoms change, worsen or become concerning in any way, please go to the emergency room immediately, otherwise you can followup with your PCP in 2-3 days as needed

## 2025-08-02 LAB — BACTERIA UR CULT: NORMAL

## 2025-11-14 ENCOUNTER — APPOINTMENT (OUTPATIENT)
Dept: UROLOGY | Facility: CLINIC | Age: 40
End: 2025-11-14
Payer: COMMERCIAL

## 2025-12-06 ENCOUNTER — APPOINTMENT (OUTPATIENT)
Dept: OTOLARYNGOLOGY | Facility: CLINIC | Age: 40
End: 2025-12-06
Payer: COMMERCIAL

## 2026-02-20 ENCOUNTER — APPOINTMENT (OUTPATIENT)
Dept: PRIMARY CARE | Facility: CLINIC | Age: 41
End: 2026-02-20
Payer: COMMERCIAL